# Patient Record
Sex: FEMALE | Race: BLACK OR AFRICAN AMERICAN | NOT HISPANIC OR LATINO | Employment: UNEMPLOYED | URBAN - METROPOLITAN AREA
[De-identification: names, ages, dates, MRNs, and addresses within clinical notes are randomized per-mention and may not be internally consistent; named-entity substitution may affect disease eponyms.]

---
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
## 2022-09-13 ENCOUNTER — HOSPITAL ENCOUNTER (EMERGENCY)
Facility: HOSPITAL | Age: 22
Discharge: HOME | End: 2022-09-13
Attending: EMERGENCY MEDICINE

## 2022-09-13 VITALS
OXYGEN SATURATION: 100 % | SYSTOLIC BLOOD PRESSURE: 120 MMHG | BODY MASS INDEX: 44.73 KG/M2 | RESPIRATION RATE: 15 BRPM | DIASTOLIC BLOOD PRESSURE: 84 MMHG | HEART RATE: 64 BPM | TEMPERATURE: 98 F | WEIGHT: 285 LBS | HEIGHT: 67 IN

## 2022-09-13 DIAGNOSIS — H92.01 OTALGIA OF RIGHT EAR: Primary | ICD-10-CM

## 2022-09-13 PROCEDURE — 99282 EMERGENCY DEPT VISIT SF MDM: CPT

## 2022-09-13 NOTE — Clinical Note
"Meghana "Meghana" Tammy was seen and treated in our emergency department on 9/13/2022.  She may return to work on 09/15/2022.       If you have any questions or concerns, please don't hesitate to call.      ROB RN    "

## 2022-09-13 NOTE — ED PROVIDER NOTES
Encounter Date: 9/13/2022       History     Chief Complaint   Patient presents with    Otalgia     Right ear pain x 3 days that has not improved. VSS WNL, NADN at this time. Ibuprofen taken today did improve pain.      21-year-old female presents emergency room for evaluation of right ear pain for the last 3 days.  No changes in hearing.  No drainage.  No fever chills.  No dental problems.  Patient did take an ibuprofen which significantly improved her pain but wanted to be checked out.  No additional complaints.    Review of patient's allergies indicates:  No Known Allergies  History reviewed. No pertinent past medical history.  History reviewed. No pertinent surgical history.  History reviewed. No pertinent family history.     Review of Systems   Constitutional:  Negative for chills, fatigue and fever.   HENT:  Positive for ear pain. Negative for congestion, hearing loss, sore throat and trouble swallowing.    Eyes:  Negative for visual disturbance.   Respiratory:  Negative for cough, chest tightness and shortness of breath.    Cardiovascular:  Negative for chest pain.   Gastrointestinal:  Negative for abdominal pain and nausea.   Endocrine: Negative for polyuria.   Genitourinary:  Negative for difficulty urinating.   Musculoskeletal:  Negative for arthralgias and myalgias.   Skin:  Negative for rash.   Neurological:  Negative for dizziness and headaches.   Psychiatric/Behavioral:  The patient is not nervous/anxious.      Physical Exam     Initial Vitals [09/13/22 1721]   BP Pulse Resp Temp SpO2   122/85 61 20 98.7 °F (37.1 °C) 100 %      MAP       --         Physical Exam    Nursing note and vitals reviewed.  Constitutional: She appears well-developed and well-nourished.   HENT:   Exam of the right ear shows a normal tympanic membrane with small amount of fluid.  No erythema.  TM is mobile.    Otherwise external Ear is without lesions or tenderness. No discoloration, edema, or tenderness of the mastoid. EACs  nonobstructed, without swelling or erythema. TM b/l is pearly gray and translucent with anterior/inferior light reflex and nondistorted landmarks. TM b/l is mobile. No evidence of middle ear effusion. Patient can lateralize sound with no decreased hearing b/l.     Nose is midline without lesions. Nasal passages patent. Mucosa is pink and moist without hemorrhage or lesions.   No maxillary or frontal sinus tenderness.     Lips, tongue, and oral mucosa are pink and moist without lesions. Tonsils are Grade 0 and equal with midline uvula. Teeth are in good repair and nontender. No palpable stones/masses/induration.     Eyes: Conjunctivae are normal. Pupils are equal, round, and reactive to light.   Neck: Neck supple.   Normal range of motion.  Cardiovascular:  Normal rate, regular rhythm and normal heart sounds.           Pulmonary/Chest: Breath sounds normal.   Abdominal: Abdomen is soft. She exhibits no distension. There is no abdominal tenderness.   Musculoskeletal:         General: Normal range of motion.      Cervical back: Normal range of motion and neck supple.     Neurological: She is alert and oriented to person, place, and time. GCS score is 15. GCS eye subscore is 4. GCS verbal subscore is 5. GCS motor subscore is 6.   Skin: Skin is warm and dry. Capillary refill takes less than 2 seconds.   Psychiatric: She has a normal mood and affect. Her behavior is normal. Judgment and thought content normal.       ED Course   Procedures  Labs Reviewed - No data to display       Imaging Results    None          Medications - No data to display  Medical Decision Making:   Initial Assessment:   Nontoxic, well-appearing and in no acute distress.  ED Management:  21-year-old female presents emergency room for evaluation of right ear pain for the last 3 days that has improved with ibuprofen.  Patient has no systemic signs of illness.  Physical exam has no evidence of infection or inflammation, only mild fluid behind the ears.   Remainder of ENT exam is benign.  Patient is nontoxic, afebrile and in no acute distress.  Likely serous otitis media.  Regardless, no indication for intervention/antibiotics at this time.  Recommend she continue with ibuprofen and Tylenol for pain management and return for worsening symptoms or persistence.      Disposition:  Improved, discharged  Plan to discharge home with appropriate follow-up, including primary care manager.    I discussed the findings and plan of care with this patient.  All questions were answered to the patient's satisfaction.  Disposition plan as above.  Verbal and written discharge instructions provided to the patient on discharge.  Return precautions discussed prior to discharge.     I discuss this patient case with the cosigning physician, who agrees with diagnosis and plan of care. This note was written using the assistance of a dictation program and may contain grammatical errors.                  Attending Note:   I discussed the patient's care with Advanced Practice Clinician.  I reviewed their note and agree with the history, physical, assessment, diagnosis, treatment, all procedures performed, xray and EKG interpretations and discharge plan provided by the Advanced Practice Clinician. My overall impression is otalgia of right ear.  The patient has been instructed to follow up with their physician or the one provided as well as specific return precautions.   Chrissy Slater M.D. 9/13/2022 10:25 PM         Clinical Impression:   Final diagnoses:  [H92.01] Otalgia of right ear (Primary)        ED Disposition Condition    Discharge Stable          ED Prescriptions    None       Follow-up Information       Follow up With Specialties Details Why Contact Info Additional Information    [facility] - Emergency Dept Emergency Medicine Go to  As needed, If symptoms worsen [address] [facility] [id number] [phone] 1st floor             Garrett Kraft  ROSALINDA  09/13/22 1813       Chrissy Slater MD  09/13/22 0381

## 2023-02-24 ENCOUNTER — HOSPITAL ENCOUNTER (EMERGENCY)
Facility: HOSPITAL | Age: 23
Discharge: HOME | End: 2023-02-24
Attending: EMERGENCY MEDICINE
Payer: MEDICAID

## 2023-02-24 VITALS
DIASTOLIC BLOOD PRESSURE: 67 MMHG | OXYGEN SATURATION: 100 % | RESPIRATION RATE: 16 BRPM | WEIGHT: 285 LBS | SYSTOLIC BLOOD PRESSURE: 138 MMHG | HEIGHT: 67 IN | BODY MASS INDEX: 44.73 KG/M2 | TEMPERATURE: 98 F | HEART RATE: 62 BPM

## 2023-02-24 DIAGNOSIS — G43.809 OTHER MIGRAINE WITHOUT STATUS MIGRAINOSUS, NOT INTRACTABLE: Primary | ICD-10-CM

## 2023-02-24 LAB
B-HCG UR QL: NEGATIVE
CTP QC/QA: YES

## 2023-02-24 PROCEDURE — 25000003 PHARM REV CODE 250: Performed by: NURSE PRACTITIONER

## 2023-02-24 PROCEDURE — 96374 THER/PROPH/DIAG INJ IV PUSH: CPT

## 2023-02-24 PROCEDURE — 96361 HYDRATE IV INFUSION ADD-ON: CPT

## 2023-02-24 PROCEDURE — 81025 URINE PREGNANCY TEST: CPT | Performed by: NURSE PRACTITIONER

## 2023-02-24 PROCEDURE — 96375 TX/PRO/DX INJ NEW DRUG ADDON: CPT

## 2023-02-24 PROCEDURE — 63600175 PHARM REV CODE 636 W HCPCS: Performed by: NURSE PRACTITIONER

## 2023-02-24 PROCEDURE — 99284 EMERGENCY DEPT VISIT MOD MDM: CPT | Mod: 25

## 2023-02-24 RX ORDER — KETOROLAC TROMETHAMINE 30 MG/ML
15 INJECTION, SOLUTION INTRAMUSCULAR; INTRAVENOUS
Status: COMPLETED | OUTPATIENT
Start: 2023-02-24 | End: 2023-02-24

## 2023-02-24 RX ORDER — PROCHLORPERAZINE EDISYLATE 5 MG/ML
10 INJECTION INTRAMUSCULAR; INTRAVENOUS
Status: COMPLETED | OUTPATIENT
Start: 2023-02-24 | End: 2023-02-24

## 2023-02-24 RX ORDER — BUTALBITAL, ACETAMINOPHEN AND CAFFEINE 50; 325; 40 MG/1; MG/1; MG/1
1 TABLET ORAL EVERY 4 HOURS PRN
Qty: 20 TABLET | Refills: 0 | Status: SHIPPED | OUTPATIENT
Start: 2023-02-24 | End: 2023-03-08 | Stop reason: SDUPTHER

## 2023-02-24 RX ORDER — DIPHENHYDRAMINE HYDROCHLORIDE 50 MG/ML
12.5 INJECTION INTRAMUSCULAR; INTRAVENOUS
Status: COMPLETED | OUTPATIENT
Start: 2023-02-24 | End: 2023-02-24

## 2023-02-24 RX ADMIN — KETOROLAC TROMETHAMINE 15 MG: 30 INJECTION, SOLUTION INTRAMUSCULAR; INTRAVENOUS at 04:02

## 2023-02-24 RX ADMIN — SODIUM CHLORIDE 1000 ML: 0.9 INJECTION, SOLUTION INTRAVENOUS at 04:02

## 2023-02-24 RX ADMIN — DIPHENHYDRAMINE HYDROCHLORIDE 12.5 MG: 50 INJECTION INTRAMUSCULAR; INTRAVENOUS at 04:02

## 2023-02-24 RX ADMIN — PROCHLORPERAZINE EDISYLATE 10 MG: 5 INJECTION INTRAMUSCULAR; INTRAVENOUS at 04:02

## 2023-02-24 NOTE — DISCHARGE INSTRUCTIONS
Make a follow-up appoint with Dr. Colten Dunbar.  Make a follow-up appoint for a primary care doctor at [facility].  Taking medications as prescribed.  Return to the ED for any worsening of symptoms or any other concerns.

## 2023-02-24 NOTE — ED PROVIDER NOTES
Encounter Date: 2/24/2023       History     Chief Complaint   Patient presents with    Migraine     X 3 weeks      Presents with complaint of a migraine headache.  Patient has history of same.  Onset 3 weeks.  She states she is taken Excedrin migraine BC powders and ibuprofen with no relief.  She has just recently moved here and has no neurologist.  She denies nausea vomiting photophobia or blurred vision.    Review of patient's allergies indicates:  No Known Allergies  No past medical history on file.  No past surgical history on file.  No family history on file.     Review of Systems   Constitutional:  Negative for chills and fever.   Respiratory:  Negative for cough, shortness of breath and wheezing.    Cardiovascular:  Negative for chest pain, palpitations and leg swelling.   Gastrointestinal:  Negative for abdominal pain, diarrhea, nausea and vomiting.   Musculoskeletal:  Negative for back pain.   Skin:  Negative for rash.   Neurological:  Positive for headaches. Negative for dizziness, speech difficulty, weakness, light-headedness and numbness.     Physical Exam     Initial Vitals [02/24/23 1442]   BP Pulse Resp Temp SpO2   (!) 150/71 88 20 98.1 °F (36.7 °C) 99 %      MAP       --         Physical Exam    Constitutional: She appears well-developed and well-nourished.   HENT:   Head: Normocephalic and atraumatic.   Mouth/Throat: Oropharynx is clear and moist.   Eyes: Conjunctivae are normal. Pupils are equal, round, and reactive to light.   Neck: Neck supple.   Normal range of motion.  Cardiovascular:  Normal rate, regular rhythm and normal heart sounds.           Pulmonary/Chest: Breath sounds normal. No respiratory distress.   Musculoskeletal:         General: Normal range of motion.      Cervical back: Normal range of motion and neck supple.      Comments: Patient is ambulatory per self     Neurological: She is alert and oriented to person, place, and time. No sensory deficit. GCS score is 15. GCS eye  subscore is 4. GCS verbal subscore is 5. GCS motor subscore is 6.   Skin: Skin is warm and dry. Capillary refill takes less than 2 seconds.   Psychiatric: She has a normal mood and affect. Thought content normal.       ED Course   Procedures  Labs Reviewed   POCT URINE PREGNANCY          Imaging Results    None          Medications   sodium chloride 0.9% bolus 1,000 mL 1,000 mL (0 mLs Intravenous Stopped 2/24/23 1719)   ketorolac injection 15 mg (15 mg Intravenous Given 2/24/23 1619)   prochlorperazine injection Soln 10 mg (10 mg Intravenous Given 2/24/23 1619)   diphenhydrAMINE injection 12.5 mg (12.5 mg Intravenous Given 2/24/23 1619)     Medical Decision Making:   Initial Assessment:   Presents with migraine headache.  Onset 3 weeks.  Patient reports she has history of same.  She is taking BC powder Excedrin migraine and ibuprofen without any relief.  She recently moved here so she has no PCP or neurologist.  Denies blurred vision photophobia nausea and vomiting.  ED Management:  Patient was given a L of normal saline with Toradol Compazine and Benadryl.  She reports her headache is now a 3/10.  She states she is feeling much better.  I have discharged her with a prescription for Fioricet to take p.r.n..  I have given her Dr. Colten Dunbar's name +[facility] is phone number for primary care.  At discharge is patient appeared to be in no acute distress.  She is much improved.  Have discussed this patient with                         Clinical Impression:   Final diagnoses:  [G43.809] Other migraine without status migrainosus, not intractable (Primary)        ED Disposition Condition    Discharge Stable          ED Prescriptions       Medication Sig Dispense Start Date End Date Auth. Provider    butalbital-acetaminophen-caffeine -40 mg (FIORICET, ESGIC) -40 mg per tablet Take 1 tablet by mouth every 4 (four) hours as needed for Pain. 20 tablet 2/24/2023 3/26/2023 Nancy Shipman NP           Follow-up Information       Follow up With Specialties Details Why Contact Info    [facility]  In 3 days  [address] [address]  [phone]      Eduardo Dunbar MD Vascular Neurology, Neurology In 3 days  [address]  [place] [address]  [phone]               Nancy Shipman NP  02/24/23 1747       Natalie Pedroza MD  02/24/23 2015

## 2023-02-25 ENCOUNTER — HOSPITAL ENCOUNTER (EMERGENCY)
Facility: HOSPITAL | Age: 23
Discharge: HOME | End: 2023-02-25
Attending: EMERGENCY MEDICINE
Payer: MEDICAID

## 2023-02-25 VITALS
OXYGEN SATURATION: 98 % | HEART RATE: 75 BPM | SYSTOLIC BLOOD PRESSURE: 137 MMHG | HEIGHT: 67 IN | RESPIRATION RATE: 16 BRPM | WEIGHT: 293 LBS | DIASTOLIC BLOOD PRESSURE: 82 MMHG | TEMPERATURE: 98 F | BODY MASS INDEX: 45.99 KG/M2

## 2023-02-25 DIAGNOSIS — R51.9 NONINTRACTABLE HEADACHE, UNSPECIFIED CHRONICITY PATTERN, UNSPECIFIED HEADACHE TYPE: Primary | ICD-10-CM

## 2023-02-25 PROCEDURE — 63600175 PHARM REV CODE 636 W HCPCS: Performed by: EMERGENCY MEDICINE

## 2023-02-25 PROCEDURE — 96372 THER/PROPH/DIAG INJ SC/IM: CPT | Performed by: EMERGENCY MEDICINE

## 2023-02-25 PROCEDURE — 99284 EMERGENCY DEPT VISIT MOD MDM: CPT | Mod: 25

## 2023-02-25 RX ORDER — DEXAMETHASONE SODIUM PHOSPHATE 4 MG/ML
8 INJECTION, SOLUTION INTRA-ARTICULAR; INTRALESIONAL; INTRAMUSCULAR; INTRAVENOUS; SOFT TISSUE
Status: DISCONTINUED | OUTPATIENT
Start: 2023-02-25 | End: 2023-02-25

## 2023-02-25 RX ORDER — DEXAMETHASONE SODIUM PHOSPHATE 4 MG/ML
8 INJECTION, SOLUTION INTRA-ARTICULAR; INTRALESIONAL; INTRAMUSCULAR; INTRAVENOUS; SOFT TISSUE
Status: COMPLETED | OUTPATIENT
Start: 2023-02-25 | End: 2023-02-25

## 2023-02-25 RX ADMIN — DEXAMETHASONE SODIUM PHOSPHATE 8 MG: 4 INJECTION, SOLUTION INTRA-ARTICULAR; INTRALESIONAL; INTRAMUSCULAR; INTRAVENOUS; SOFT TISSUE at 04:02

## 2023-02-25 NOTE — ED PROVIDER NOTES
Encounter Date: 2/25/2023       History     Chief Complaint   Patient presents with    Headache     For 3 weeks, seen yesterday for same     Patient presents complaining of headache.  Patient was here for the same yesterday.  Patient complains of a 4 to 8/10 headache that is mostly right-sided that has been intermittent for the last 3 weeks.  No sudden onset.  No thunderclap.  At the worst symptoms are mild-to-moderate.  Nothing makes it better or worse.  No one-sided numbness tingling or weakness.    Review of patient's allergies indicates:  No Known Allergies  Past Medical History:   Diagnosis Date    Mixed hyperlipidemia      History reviewed. No pertinent surgical history.  History reviewed. No pertinent family history.  Social History     Tobacco Use    Smoking status: Never    Smokeless tobacco: Never   Substance Use Topics    Alcohol use: Not Currently     Review of Systems   All other systems reviewed and are negative.    Physical Exam     Initial Vitals [02/25/23 1450]   BP Pulse Resp Temp SpO2   137/82 75 16 98.2 °F (36.8 °C) 98 %      MAP       --         Physical Exam    Nursing note and vitals reviewed.  Constitutional: She appears well-developed and well-nourished.   Pleasant, polite   HENT:   Head: Normocephalic and atraumatic.   Mouth/Throat: Oropharynx is clear and moist.   Eyes: EOM are normal.   Neck: Neck supple.   Normal range of motion.  Cardiovascular:  Normal rate, regular rhythm, normal heart sounds and intact distal pulses.           Pulmonary/Chest: Breath sounds normal. No respiratory distress.   Abdominal: Abdomen is soft.   Musculoskeletal:         General: Normal range of motion.      Cervical back: Normal range of motion and neck supple.     Neurological: She is alert and oriented to person, place, and time. She has normal strength.   Vision - Normal  Aphasia - Normal  Neglect - Normal  Pronator drift - Normal  Cerebellum - Normal  RUE strength - Normal  RLE strength - Normal  LUE  strength - Normal  LLE strength - Normal   Skin: Skin is warm and dry. Capillary refill takes less than 2 seconds.   Psychiatric: She has a normal mood and affect. Her behavior is normal. Judgment and thought content normal.       ED Course   Procedures  Labs Reviewed - No data to display       Imaging Results              CT Head Without Contrast (Final result)  Result time 02/25/23 15:49:43      Final result by Pablito Ly MD (02/25/23 15:49:43)                   Narrative:    CMS MANDATED QUALITY DATA-CT RADIATION DOSE-436  All CT scans at this facility dose modulation, iterative reconstruction, and or weight-based dosing when appropriate to reduce radiation dose to as low as reasonably achievable.    HISTORY: Headache, new or worsening, neuro deficit (Age 19-49y)    FINDINGS: No prior studies for comparison. There is no acute intracranial hemorrhage, with no mass effect or abnormal extra-axial fluid. Gray white differentiation is maintained, with the cortical sulci, ventricles and basal cisterns normal in size for the patient's age.    The cerebellum and brainstem are unremarkable. The visualized paranasal sinuses and mastoid air cells are clear.  There is no acute osseous abnormality.    IMPRESSION: Negative noncontrast head CT.    Electronically signed by:  Pablito Ly MD  2/25/2023 3:49 PM CST Workstation: [id number]                                     Medications   dexAMETHasone injection 8 mg (8 mg Intramuscular Given 2/25/23 1627)     Medical Decision Making:   Initial Assessment:   No apparent distress  Differential Diagnosis:   Considerations include but are not limited to migraine, tumor, mass, sinusitis  ED Management:  Repeat ER visit with recurring migraine with negative head CT in the emergency department.  I personally reviewed and I reviewed the radiologist's interpretation.  Patient received IM Decadron with improvement.  Patient will be discharged in stable condition.  Detailed return  precautions discussed.                        Clinical Impression:   Final diagnoses:  [R51.9] Nonintractable headache, unspecified chronicity pattern, unspecified headache type (Primary)        ED Disposition Condition    Discharge Stable          ED Prescriptions    None       Follow-up Information    None          Edward Cid MD  02/25/23 0632

## 2023-03-03 ENCOUNTER — PATIENT OUTREACH (OUTPATIENT)
Dept: EMERGENCY MEDICINE | Facility: HOSPITAL | Age: 23
End: 2023-03-03

## 2023-03-03 NOTE — PROGRESS NOTES
Chen Mccullough  ED Navigator  Emergency Department    Project: [facility] ED Navigator  Role: Community Health Worker    Date: 03/03/2023  Patient Name: eMghana Munoz  MRN: [id number]  PCP: Primary Doctor No    Assessment:     Meghana Munoz is a 22 y.o. female who has presented to ED for Headache. Patient has visited the ED 2 times in the past 3 months. Patient did not contact PCP.     ED Navigator Initial Assessment    ED Navigator Enrollment Documentation  Consent to Services  Does patient consent to completing the assessment?: Yes  Contact  Method of Initial Contact: Phone  Transportation  Does the patient have issues with Transportation?: No  Does the patient have transportation to and from healthcare appointments?: Yes  Insurance Coverage  Do you have coverage/adequate coverage?: Yes  Type/kind of coverage: Medicaid/[employer]na  Is patient able to afford co-pays/deductibles?: Yes  Is patient able to afford HME or supplies?: Yes  Does patient have an established [facility] PCP?: No  Does patient need assistance finding a PCP?: Yes  Does the patient have a lack of adequate coverage?: No  Specialist Appointment  Did the patient come to the ED to see a specialist?: No  Does the patient have a pending specialist referral?: No  Does the patient have a specialist appointment made?: No  PCP Follow Up Appointment  Has the patient had an appointment with a primary care provider in the past year?: No  Does the patient have a follow up appontment with a PCP?: No  When was the last time you saw your PCP?: 3/3/22  Why does the patient not have a follow up scheduled?: No established [facility]/outside PCP  Would you like an [facility] PCP?: Yes  Medications  Is patient able to afford medication?: Yes  Is patient unable to get medication due to lack of transportation?: No  Psychological  Does the patient have psycho-social concerns?: Yes  What concerns does the patient have?: Difficulty coping with life situations, Anxiety and/or  Depression  Food  Does the patient have concerns about food?: No  Communication/Education  Does the patient have limited English proficiency/English not primary language?: No  Does patient have low literacy and/or low health literacy?: Yes  Does patient have concerns with care?: No  Does patient have dissatisfaction with care?: No  Other Financial Concerns  Does the patient have immediate financial distress?: No  Does the patient have general financial concerns?: No  Other Social Barriers/Concerns  Does the patient have any additional barriers or concerns?: Dental  Primary Barrier  Barriers identified: Cognitive barrier (health literacy, language and communication, etc.)  Root Cause of ED Utilization: Lack of Access to Primary Care  Plan to address Lack of Access to Primary Care: Provided information for [facility] ([facility], [facility], etc.), Provided information for [facility] On [facility] 24/7 Nurse Triage line [phone] or 1-866-[facility] ([phone])  Next steps: Provided Education  Was education/educational materials provided surrounding PCP services/creating a medical home?: Yes Was education verbal or written?: Written     Was education/educational materials provided surrounding low cost, healthy foods?: Yes Was education verbal or written?: Written     Was education/educational materials provided surrounding other items? If so, use comment to explain.: Yes Was education verbal or written?: Written   Plan: Provided information for [facility] 24/7 Nurse triage line, [phone] or [facility] ([phone])  Expected Date of Follow Up 1: 3/31/23         Social History     Socioeconomic History    Marital status: Single   Tobacco Use    Smoking status: Never    Smokeless tobacco: Never   Substance and Sexual Activity    Alcohol use: Not Currently     Social Determinants of Health     Financial Resource Strain: Low Risk      Difficulty of Paying Living Expenses: Not hard at all   Food Insecurity: No Food Insecurity    Worried About Running Out of Food in the Last Year: Never true    Ran Out of Food in the Last Year: Never true   Transportation Needs: No Transportation Needs    Lack of Transportation (Medical): No    Lack of Transportation (Non-Medical): No   Stress: No Stress Concern Present    Feeling of Stress : Only a little   Social Connections: Unknown    Frequency of Communication with Friends and Family: More than three times a week    Frequency of Social Gatherings with Friends and Family: More than three times a week    Marital Status: Never    Housing Stability: Unknown    Unable to Pay for Housing in the Last Year: No    Unstable Housing in the Last Year: No       Plan:   Spoke with patient on the telephone and completed initial enrollment.  Patient reported she recently moved to [place] from [place] and has not had time to establish a PCP.  Patient accepted my offer to help her establish one, and also requested information on dentists who accept her insurance.  Patient stated she is still having headaches since her ER visit, and she has an eye exam scheduled next week to see if vision issues may be causing them.  Patient said she still may need to see a neurologist if vision is not the reason for her headaches.  Patient denied any concerns with food, housing, utilities, or transportation. Patient reported no use of cigarettes or alcohol.  When asked about depression/anxiety, patient stated she has moments of sadness and also lost two uncles within the last year.  Patient was given education on ([facility] information, [facility] Virtual [facility] information, and Heart healthy diet tips), [facility] Nurse Triage line, behavioral health resources, dental resources, list of neurologists from the [employer] web site.  An email was sent to Kiera Hernandez at [facility] requesting an appointment for patient to  establish a PCP.    Chen Mccullough  ED Navigator

## 2023-03-08 ENCOUNTER — HOSPITAL ENCOUNTER (EMERGENCY)
Facility: HOSPITAL | Age: 23
Discharge: HOME | End: 2023-03-08
Attending: EMERGENCY MEDICINE
Payer: MEDICAID

## 2023-03-08 VITALS
BODY MASS INDEX: 45.99 KG/M2 | TEMPERATURE: 98 F | OXYGEN SATURATION: 99 % | HEIGHT: 67 IN | SYSTOLIC BLOOD PRESSURE: 141 MMHG | DIASTOLIC BLOOD PRESSURE: 77 MMHG | RESPIRATION RATE: 18 BRPM | HEART RATE: 77 BPM | WEIGHT: 293 LBS

## 2023-03-08 DIAGNOSIS — R51.9 NONINTRACTABLE HEADACHE, UNSPECIFIED CHRONICITY PATTERN, UNSPECIFIED HEADACHE TYPE: Primary | ICD-10-CM

## 2023-03-08 PROCEDURE — 99282 EMERGENCY DEPT VISIT SF MDM: CPT

## 2023-03-08 RX ORDER — BUTALBITAL, ACETAMINOPHEN AND CAFFEINE 50; 325; 40 MG/1; MG/1; MG/1
1 TABLET ORAL EVERY 4 HOURS PRN
Qty: 12 TABLET | Refills: 0 | Status: SHIPPED | OUTPATIENT
Start: 2023-03-08 | End: 2023-03-13

## 2023-03-08 NOTE — ED PROVIDER NOTES
Encounter Date: 3/8/2023       History     Chief Complaint   Patient presents with    Headache     Headaches x1 month, seen here for same, relieved by fioricet, unable to get neurology appointment until tomorrow     22-year-old female with no significant past medical history other than hyperlipidemia presents emergency department with headache.  Patient says that when she was 12 years old she is to get migraines but has not had any for the last 10 years.  She says that over the last month she is been getting headaches.  She says that described as a burning-type sensation mostly on the right side of her head sometimes throbbing sometimes dull.  It has been coming and going for the last month.  She says that she is been to the ER twice.  She has had a CT scan which did not show any acute abnormalities in her brain.  She says that she was given a prescription for Fioricet and is out of Fioricet.  Fioricet was the only thing that seems to help with her headaches.  She is tried over-the-counter medications but she has not received any relief.  She made a follow-up with an ophthalmologist because she thought astigmatism might be causing her headaches and the eye doctor said that she could try some glasses to see if it helped.  She did get an eye exam and no abnormalities were found per the patient.  Patient states that Fioricet was the only thing that was helping her.  She is out of her Fioricet took her last dose yesterday she says she has an appointment tomorrow with neurologist.  Headaches have been persistent but have not gotten worse or changed.  They have just been persistent for the last month.  Not sudden onset, not associated with any syncope not associated with any seizure activity.  No reported fever chills.  No visual disturbance reported.    Review of patient's allergies indicates:  No Known Allergies  Past Medical History:   Diagnosis Date    Mixed hyperlipidemia      No past surgical history on file.  No  family history on file.  Social History     Tobacco Use    Smoking status: Never    Smokeless tobacco: Never   Substance Use Topics    Alcohol use: Not Currently     Review of Systems   Constitutional:  Negative for chills and fever.   HENT:  Negative for sore throat.    Respiratory:  Negative for shortness of breath.    Cardiovascular:  Negative for chest pain and palpitations.   Gastrointestinal:  Negative for abdominal pain, nausea and vomiting.   Genitourinary:  Negative for dysuria.   Musculoskeletal:  Negative for back pain.   Skin:  Negative for rash.   Neurological:  Positive for headaches. Negative for weakness.   Hematological:  Does not bruise/bleed easily.   All other systems reviewed and are negative.    Physical Exam     Initial Vitals [03/08/23 1436]   BP Pulse Resp Temp SpO2   (!) 141/77 77 18 98.2 °F (36.8 °C) 99 %      MAP       --         Physical Exam    Nursing note and vitals reviewed.  Constitutional: She appears well-developed and well-nourished. No distress.   HENT:   Head: Normocephalic and atraumatic.   Mouth/Throat: No oropharyngeal exudate.   Eyes: Conjunctivae and EOM are normal. Pupils are equal, round, and reactive to light.   Neck: Neck supple. No tracheal deviation present.   No neck stiffness, no meningismus   Normal range of motion.  Cardiovascular:  Normal rate, regular rhythm, normal heart sounds and intact distal pulses.           No murmur heard.  Pulmonary/Chest: Breath sounds normal. No stridor. No respiratory distress. She has no wheezes. She has no rhonchi. She has no rales.   Abdominal: Abdomen is soft. She exhibits no distension. There is no abdominal tenderness. There is no rebound and no guarding.   Musculoskeletal:         General: No tenderness or edema. Normal range of motion.      Cervical back: Normal range of motion and neck supple.     Lymphadenopathy:     She has no cervical adenopathy.   Neurological: She is alert and oriented to person, place, and time. She  has normal strength. No cranial nerve deficit or sensory deficit. GCS score is 15. GCS eye subscore is 4. GCS verbal subscore is 5. GCS motor subscore is 6.   Speech normal, no facial droop.  5/5 strength in upper and lower extremities bilaterally.  Normal finger-to-nose.  No pronator drift.   Skin: Skin is warm and dry. Capillary refill takes less than 2 seconds. No rash noted. No erythema. No pallor.   Psychiatric: She has a normal mood and affect. Her behavior is normal. Thought content normal.       ED Course   Procedures  Labs Reviewed - No data to display       Imaging Results    None          Medications - No data to display  Medical Decision Making:   ED Management:  Emergent evaluation of a 22-year-old female who presents emergency department with headache.  Initial differential includes but not limited to normal pressure hydrocephalus, I pathology, headache, migraine, tension headache, nonspecific headache, etcetera.  Patient seen here recently twice, 3rd ER visit for headache.  Has follow-up tomorrow with Neurology.  Seemed to have relief of symptoms with Fioricet and seems be out of the medication.  She has good follow-up tomorrow with Neurology on an outpatient basis.  She is requesting some more Fioricet to help with her symptoms.  Currently has a mild headache.  Patient has a nonfocal and normal neurological exam.  She is ambulatory without difficulty with a normal steady gait.  Given her reassuring exam findings I do not feel further workup in the emergency department indicated with recent normal head CT scan and nonfocal/normal neurological exam.     I Do not suspect meningitis (no fever, no stiff neck, no confusion, symptoms present x1 month). Do not suspect vascular dissection or venous thrombosis (no neck pain, neuro deficits, or seizure activity, 1 month of symptoms). Do not suspect Carbon Monoxide toxicity (no known source of CO exposure), Do not suspect temporal arteritis (too young, no  temporal artery tenderness, no jaw claudication). Do not suspect glaucoma (no eye pain, normal pupils, no conjunctival injection). Do not suspect SAH ( low pretest probability, month symptoms), Pseudotumor cerebri ( recent eye exam performed, no reported abnormalities, no visual changes) space occupying lesion ( CT negative, low pretest probability) . We discussed the low probability of these conditions and the patient seems to understand that no one is zero risk for any of these conditions, but given this information is comfortable with the work up performed in the Emergency department and is agreeable to follow up with PCP and with Neurology for further outpatient work up and /or laboratory / diagnostic testing. Pt. verbalized, understood and agreed with the treatment and follow up plan.    A dictation software program was used for this note.  Please expect some simple typographical  errors in this note.                           Clinical Impression:   Final diagnoses:  [R51.9] Nonintractable headache, unspecified chronicity pattern, unspecified headache type (Primary)        ED Disposition Condition    Discharge Stable          ED Prescriptions       Medication Sig Dispense Start Date End Date Auth. Provider    butalbital-acetaminophen-caffeine -40 mg (FIORICET, ESGIC) -40 mg per tablet Take 1 tablet by mouth every 4 (four) hours as needed for Headaches. 12 tablet 3/8/2023 3/13/2023 Dallin Washington DO          Follow-up Information       Follow up With Specialties Details Why Contact Info Additional Information    [facility] - Emergency Dept Emergency Medicine  If symptoms worsen [address] [id number]  [phone] 1st floor    Rayshawn Evans MD Neurology In 1 day  [address] [address]  [phone]                Dallin Washington, DO  03/08/23 1500       Dallin Washington, DO  03/08/23 6171

## 2023-03-08 NOTE — DISCHARGE INSTRUCTIONS
RETURN TO EMERGENCY DEPARTMENT WITHOUT FAIL, IF YOUR SYMPTOMS WORSEN, IF YOU GET NEW OR DIFFERENT SYMPTOMS, IF YOU ARE UNABLE TO FOLLOW UP AS DIRECTED, OR IF YOU HAVE ANY CONCERNS OR WORRIES.    Follow-up with Neurology tomorrow as discussed.

## 2023-04-13 LAB
HBV SURFACE AG SERPL QL IA: NEGATIVE
HIV 1+2 AB+HIV1 P24 AG SERPL QL IA: NORMAL
RPR: NORMAL
RUBELLA IMMUNE STATUS: NORMAL

## 2023-05-19 ENCOUNTER — HOSPITAL ENCOUNTER (EMERGENCY)
Facility: HOSPITAL | Age: 23
Discharge: HOME | End: 2023-05-19
Attending: EMERGENCY MEDICINE
Payer: MEDICAID

## 2023-05-19 VITALS
SYSTOLIC BLOOD PRESSURE: 105 MMHG | RESPIRATION RATE: 17 BRPM | WEIGHT: 293 LBS | HEIGHT: 67 IN | DIASTOLIC BLOOD PRESSURE: 52 MMHG | OXYGEN SATURATION: 100 % | HEART RATE: 65 BPM | BODY MASS INDEX: 45.99 KG/M2

## 2023-05-19 DIAGNOSIS — O20.0 THREATENED MISCARRIAGE: Primary | ICD-10-CM

## 2023-05-19 DIAGNOSIS — B96.89 BACTERIAL VAGINOSIS: ICD-10-CM

## 2023-05-19 DIAGNOSIS — N76.0 BACTERIAL VAGINOSIS: ICD-10-CM

## 2023-05-19 LAB
ABO + RH BLD: NORMAL
ALBUMIN SERPL BCP-MCNC: 3.1 G/DL (ref 3.5–5.2)
ALP SERPL-CCNC: 60 U/L (ref 55–135)
ALT SERPL W/O P-5'-P-CCNC: 17 U/L (ref 10–44)
ANION GAP SERPL CALC-SCNC: 9 MMOL/L (ref 8–16)
AST SERPL-CCNC: 13 U/L (ref 10–40)
B-HCG UR QL: POSITIVE
BACTERIA #/AREA URNS HPF: ABNORMAL /HPF
BASOPHILS # BLD AUTO: 0.04 K/UL (ref 0–0.2)
BASOPHILS NFR BLD: 0.5 % (ref 0–1.9)
BILIRUB SERPL-MCNC: 1 MG/DL (ref 0.1–1)
BILIRUB UR QL STRIP: NEGATIVE
BUN SERPL-MCNC: 8 MG/DL (ref 6–20)
CALCIUM SERPL-MCNC: 8.5 MG/DL (ref 8.7–10.5)
CHLORIDE SERPL-SCNC: 108 MMOL/L (ref 95–110)
CLARITY UR: ABNORMAL
CLUE CELLS VAG QL WET PREP: ABNORMAL
CO2 SERPL-SCNC: 19 MMOL/L (ref 23–29)
COLOR UR: YELLOW
CREAT SERPL-MCNC: 0.5 MG/DL (ref 0.5–1.4)
CTP QC/QA: YES
DIFFERENTIAL METHOD: NORMAL
EOSINOPHIL # BLD AUTO: 0.3 K/UL (ref 0–0.5)
EOSINOPHIL NFR BLD: 3.6 % (ref 0–8)
ERYTHROCYTE [DISTWIDTH] IN BLOOD BY AUTOMATED COUNT: 13.2 % (ref 11.5–14.5)
EST. GFR  (NO RACE VARIABLE): >60 ML/MIN/1.73 M^2
FILAMENT FUNGI VAG WET PREP-#/AREA: ABNORMAL
GLUCOSE SERPL-MCNC: 94 MG/DL (ref 70–110)
GLUCOSE UR QL STRIP: NEGATIVE
HCG INTACT+B SERPL-ACNC: NORMAL MIU/ML
HCT VFR BLD AUTO: 37.7 % (ref 37–48.5)
HGB BLD-MCNC: 13 G/DL (ref 12–16)
HGB UR QL STRIP: ABNORMAL
HYALINE CASTS #/AREA URNS LPF: 6 /LPF
IMM GRANULOCYTES # BLD AUTO: 0.04 K/UL (ref 0–0.04)
IMM GRANULOCYTES NFR BLD AUTO: 0.5 % (ref 0–0.5)
KETONES UR QL STRIP: ABNORMAL
LEUKOCYTE ESTERASE UR QL STRIP: NEGATIVE
LYMPHOCYTES # BLD AUTO: 1.7 K/UL (ref 1–4.8)
LYMPHOCYTES NFR BLD: 22 % (ref 18–48)
MCH RBC QN AUTO: 29.8 PG (ref 27–31)
MCHC RBC AUTO-ENTMCNC: 34.5 G/DL (ref 32–36)
MCV RBC AUTO: 87 FL (ref 82–98)
MICROSCOPIC COMMENT: ABNORMAL
MONOCYTES # BLD AUTO: 0.5 K/UL (ref 0.3–1)
MONOCYTES NFR BLD: 6.3 % (ref 4–15)
NEUTROPHILS # BLD AUTO: 5.1 K/UL (ref 1.8–7.7)
NEUTROPHILS NFR BLD: 67.1 % (ref 38–73)
NITRITE UR QL STRIP: NEGATIVE
NRBC BLD-RTO: 0 /100 WBC
PH UR STRIP: 7 [PH] (ref 5–8)
PLATELET # BLD AUTO: 226 K/UL (ref 150–450)
PMV BLD AUTO: 9.5 FL (ref 9.2–12.9)
POTASSIUM SERPL-SCNC: 3.5 MMOL/L (ref 3.5–5.1)
PROT SERPL-MCNC: 6.4 G/DL (ref 6–8.4)
PROT UR QL STRIP: ABNORMAL
RBC # BLD AUTO: 4.36 M/UL (ref 4–5.4)
RBC #/AREA URNS HPF: 13 /HPF (ref 0–4)
SODIUM SERPL-SCNC: 136 MMOL/L (ref 136–145)
SP GR UR STRIP: 1.02 (ref 1–1.03)
SPECIMEN SOURCE: ABNORMAL
SQUAMOUS #/AREA URNS HPF: 2 /HPF
T VAGINALIS GENITAL QL WET PREP: ABNORMAL
URN SPEC COLLECT METH UR: ABNORMAL
UROBILINOGEN UR STRIP-ACNC: NEGATIVE EU/DL
WBC # BLD AUTO: 7.6 K/UL (ref 3.9–12.7)
WBC #/AREA URNS HPF: 2 /HPF (ref 0–5)
YEAST GENITAL QL WET PREP: ABNORMAL

## 2023-05-19 PROCEDURE — 80053 COMPREHEN METABOLIC PANEL: CPT | Performed by: EMERGENCY MEDICINE

## 2023-05-19 PROCEDURE — 87205 SMEAR GRAM STAIN: CPT | Performed by: EMERGENCY MEDICINE

## 2023-05-19 PROCEDURE — 99284 EMERGENCY DEPT VISIT MOD MDM: CPT | Mod: 25

## 2023-05-19 PROCEDURE — 96360 HYDRATION IV INFUSION INIT: CPT

## 2023-05-19 PROCEDURE — 25000003 PHARM REV CODE 250: Performed by: EMERGENCY MEDICINE

## 2023-05-19 PROCEDURE — 86900 BLOOD TYPING SEROLOGIC ABO: CPT | Performed by: EMERGENCY MEDICINE

## 2023-05-19 PROCEDURE — 81001 URINALYSIS AUTO W/SCOPE: CPT | Performed by: EMERGENCY MEDICINE

## 2023-05-19 PROCEDURE — 87210 SMEAR WET MOUNT SALINE/INK: CPT | Performed by: EMERGENCY MEDICINE

## 2023-05-19 PROCEDURE — 87081 CULTURE SCREEN ONLY: CPT | Performed by: EMERGENCY MEDICINE

## 2023-05-19 PROCEDURE — 84702 CHORIONIC GONADOTROPIN TEST: CPT | Performed by: EMERGENCY MEDICINE

## 2023-05-19 PROCEDURE — 81025 URINE PREGNANCY TEST: CPT | Performed by: EMERGENCY MEDICINE

## 2023-05-19 PROCEDURE — 85025 COMPLETE CBC W/AUTO DIFF WBC: CPT | Performed by: EMERGENCY MEDICINE

## 2023-05-19 PROCEDURE — 87591 N.GONORRHOEAE DNA AMP PROB: CPT | Performed by: EMERGENCY MEDICINE

## 2023-05-19 RX ORDER — METRONIDAZOLE 500 MG/1
500 TABLET ORAL EVERY 12 HOURS
Qty: 14 TABLET | Refills: 0 | Status: SHIPPED | OUTPATIENT
Start: 2023-05-19 | End: 2023-05-26

## 2023-05-19 RX ADMIN — SODIUM CHLORIDE 1000 ML: 0.9 INJECTION, SOLUTION INTRAVENOUS at 03:05

## 2023-05-19 NOTE — ED PROVIDER NOTES
Encounter Date: 2023       History     Chief Complaint   Patient presents with    Vaginal Bleeding     Approx 10 wk pregnancy     Patient presents emergency department reported vaginal bleeding started this morning no significant cramping or pain reported she is 10 weeks pregnant prenatal care with Dr. Link she is  no difficulties with pregnancy prior to this other than Dr. Fariba hoskins does have her on progesterone states her morning sickness recently improved she denies any fever chills no other symptoms at this time states the bleeding was similar to bleeding at the beginning of her periods      Review of patient's allergies indicates:  No Known Allergies  Past Medical History:   Diagnosis Date    Mixed hyperlipidemia      No past surgical history on file.  No family history on file.  Social History     Tobacco Use    Smoking status: Never    Smokeless tobacco: Never   Substance Use Topics    Alcohol use: Not Currently     Review of Systems   Constitutional:  Negative for chills and fever.   HENT:  Negative for congestion.    Gastrointestinal:  Negative for abdominal pain, diarrhea, nausea and vomiting.   Genitourinary:  Positive for vaginal bleeding. Negative for dysuria, flank pain, pelvic pain, vaginal discharge and vaginal pain.     Physical Exam     Initial Vitals   BP Pulse Resp Temp SpO2   23 0304 23 0304 23 0304 23 0303 23 0304   (!) 143/67 96 17 (P) 98.6 °F (37 °C) 98 %      MAP       --                Physical Exam    Constitutional: She appears well-developed and well-nourished. No distress.   HENT:   Head: Normocephalic and atraumatic.   Right Ear: External ear normal.   Left Ear: External ear normal.   Mouth/Throat: Oropharynx is clear and moist.   Eyes: Conjunctivae and EOM are normal. Pupils are equal, round, and reactive to light.   Neck: Neck supple.   Normal range of motion.  Cardiovascular:  Normal rate, regular rhythm, normal heart sounds and intact  distal pulses.           Pulmonary/Chest: Breath sounds normal. No respiratory distress.   Abdominal: Abdomen is soft. Bowel sounds are normal. There is no abdominal tenderness.   Genitourinary:    Genitourinary Comments: Cristy RECINOS present for entire pelvic exam normal external female genitalia scant brown blood noted in vaginal vault cervical os closed there is no adnexal mass or tenderness appreciated gravid uterus     Musculoskeletal:         General: No edema. Normal range of motion.      Cervical back: Normal range of motion and neck supple.     Neurological: She is alert and oriented to person, place, and time. GCS score is 15. GCS eye subscore is 4. GCS verbal subscore is 5. GCS motor subscore is 6.   Skin: Skin is warm and dry. Capillary refill takes less than 2 seconds. No rash noted.   Psychiatric: She has a normal mood and affect. Her behavior is normal.       ED Course   Procedures  Labs Reviewed   VAGINAL SCREEN - Abnormal; Notable for the following components:       Result Value    Clue Cells Moderate (*)     All other components within normal limits   COMPREHENSIVE METABOLIC PANEL - Abnormal; Notable for the following components:    CO2 19 (*)     Calcium 8.5 (*)     Albumin 3.1 (*)     All other components within normal limits    Narrative:     Release to patient->Immediate   URINALYSIS, REFLEX TO URINE CULTURE - Abnormal; Notable for the following components:    Appearance, UA Hazy (*)     Protein, UA Trace (*)     Ketones, UA 1+ (*)     Occult Blood UA 3+ (*)     All other components within normal limits    Narrative:     Specimen Source->Urine   URINALYSIS MICROSCOPIC - Abnormal; Notable for the following components:    RBC, UA 13 (*)     Hyaline Casts, UA 6 (*)     All other components within normal limits    Narrative:     Specimen Source->Urine   POCT URINE PREGNANCY - Abnormal; Notable for the following components:    POC Preg Test, Ur Positive (*)     All other components within normal  limits   CULTURE, GONOCOCCUS   C. TRACHOMATIS/N. GONORRHOEAE BY AMP DNA   CBC W/ AUTO DIFFERENTIAL    Narrative:     Release to patient->Immediate   HCG, QUANTITATIVE    Narrative:     Release to patient->Immediate   GROUP & RH          Imaging Results              US OB <14 Wks TransAbd & TransVag, Single Gestation (XPD) (In process)  Result time 05/19/23 04:19:12                     Medications   sodium chloride 0.9% bolus 1,000 mL 1,000 mL (1,000 mLs Intravenous New Bag 5/19/23 5011)     Medical Decision Making:   Clinical Tests:   Lab Tests: Ordered and Reviewed  Radiological Study: Ordered and Reviewed  ED Management:  Laboratory evaluation reviewed pelvic examination did show evidence of clue cells will treat her bacterial vaginosis ultrasound shows single live intrauterine pregnancy remainder of laboratory evaluation is within normal limits advised patient's findings will treat bacterial vaginosis Flagyl recommend she follow-up with Dr. Link in 2 days rest pelvic rest drink plenty fluids return for any worsened symptoms new symptoms or concerns                        Clinical Impression:   Final diagnoses:  [O20.0] Threatened miscarriage (Primary)  [N76.0, B96.89] Bacterial vaginosis        ED Disposition Condition    Discharge Stable          ED Prescriptions       Medication Sig Dispense Start Date End Date Auth. Provider    metroNIDAZOLE (FLAGYL) 500 MG tablet Take 1 tablet (500 mg total) by mouth every 12 (twelve) hours. for 7 days 14 tablet 5/19/2023 5/26/2023 Kyle Santos MD          Follow-up Information    None          Kyle Santos MD  05/19/23 2839

## 2023-05-20 LAB
CHLAMYDIA, AMPLIFIED DNA: NEGATIVE
N GONORRHOEAE, AMPLIFIED DNA: NEGATIVE

## 2023-05-22 LAB
BACTERIA GENITAL AEROBE CULT: NORMAL
GRAM STN SPEC: NORMAL

## 2023-06-15 ENCOUNTER — OFFICE VISIT (OUTPATIENT)
Dept: FAMILY MEDICINE | Facility: CLINIC | Age: 23
End: 2023-06-15
Payer: MEDICAID

## 2023-06-15 VITALS
TEMPERATURE: 99 F | OXYGEN SATURATION: 99 % | HEART RATE: 96 BPM | SYSTOLIC BLOOD PRESSURE: 100 MMHG | WEIGHT: 293 LBS | BODY MASS INDEX: 45.99 KG/M2 | DIASTOLIC BLOOD PRESSURE: 64 MMHG | RESPIRATION RATE: 19 BRPM | HEIGHT: 67 IN

## 2023-06-15 DIAGNOSIS — R77.9 HIGH SERUM PROTEIN LEVEL: Primary | ICD-10-CM

## 2023-06-15 PROCEDURE — 3008F BODY MASS INDEX DOCD: CPT | Mod: CPTII,,, | Performed by: FAMILY MEDICINE

## 2023-06-15 PROCEDURE — 3078F DIAST BP <80 MM HG: CPT | Mod: CPTII,,, | Performed by: FAMILY MEDICINE

## 2023-06-15 PROCEDURE — 3008F PR BODY MASS INDEX (BMI) DOCUMENTED: ICD-10-PCS | Mod: CPTII,,, | Performed by: FAMILY MEDICINE

## 2023-06-15 PROCEDURE — 3074F SYST BP LT 130 MM HG: CPT | Mod: CPTII,,, | Performed by: FAMILY MEDICINE

## 2023-06-15 PROCEDURE — 3074F PR MOST RECENT SYSTOLIC BLOOD PRESSURE < 130 MM HG: ICD-10-PCS | Mod: CPTII,,, | Performed by: FAMILY MEDICINE

## 2023-06-15 PROCEDURE — 1159F MED LIST DOCD IN RCRD: CPT | Mod: CPTII,,, | Performed by: FAMILY MEDICINE

## 2023-06-15 PROCEDURE — 1159F PR MEDICATION LIST DOCUMENTED IN MEDICAL RECORD: ICD-10-PCS | Mod: CPTII,,, | Performed by: FAMILY MEDICINE

## 2023-06-15 PROCEDURE — 99213 OFFICE O/P EST LOW 20 MIN: CPT | Performed by: FAMILY MEDICINE

## 2023-06-15 PROCEDURE — 3078F PR MOST RECENT DIASTOLIC BLOOD PRESSURE < 80 MM HG: ICD-10-PCS | Mod: CPTII,,, | Performed by: FAMILY MEDICINE

## 2023-06-15 PROCEDURE — 99213 PR OFFICE/OUTPT VISIT, EST, LEVL III, 20-29 MIN: ICD-10-PCS | Mod: S$PBB,,, | Performed by: FAMILY MEDICINE

## 2023-06-15 PROCEDURE — 99213 OFFICE O/P EST LOW 20 MIN: CPT | Mod: S$PBB,,, | Performed by: FAMILY MEDICINE

## 2023-06-15 NOTE — PROGRESS NOTES
Subjective:       Patient ID: Meghana Munoz is a 22 y.o. female.    Chief Complaint: lab results      Patient is here for follow-up she had blood work done at the dermatologist that showed high protein in the blood      Allergies and Medications:   Review of patient's allergies indicates:  No Known Allergies  No current outpatient medications on file.     No current facility-administered medications for this visit.       Family History:   No family history on file.    Social History:   Social History     Socioeconomic History    Marital status: Single   Tobacco Use    Smoking status: Never    Smokeless tobacco: Never   Substance and Sexual Activity    Alcohol use: Not Currently     Social Determinants of Health     Financial Resource Strain: Low Risk     Difficulty of Paying Living Expenses: Not hard at all   Food Insecurity: No Food Insecurity    Worried About Running Out of Food in the Last Year: Never true    Ran Out of Food in the Last Year: Never true   Transportation Needs: No Transportation Needs    Lack of Transportation (Medical): No    Lack of Transportation (Non-Medical): No   Stress: No Stress Concern Present    Feeling of Stress : Only a little   Social Connections: Unknown    Frequency of Communication with Friends and Family: More than three times a week    Frequency of Social Gatherings with Friends and Family: More than three times a week    Marital Status: Never    Housing Stability: Unknown    Unable to Pay for Housing in the Last Year: No    Unstable Housing in the Last Year: No       Review of Systems    Objective:     Vitals:    06/15/23 1546   BP: 100/64   Pulse: 96   Resp: 19   Temp: 98.6 °F (37 °C)        Physical Exam  Vitals and nursing note reviewed.   Constitutional:       General: She is not in acute distress.     Appearance: Normal appearance. She is well-developed and normal weight. She is not ill-appearing, toxic-appearing or diaphoretic.   HENT:      Head: Normocephalic and  atraumatic.   Eyes:      Pupils: Pupils are equal, round, and reactive to light.   Cardiovascular:      Rate and Rhythm: Normal rate and regular rhythm.      Heart sounds: Normal heart sounds. No murmur heard.    No friction rub. No gallop.   Pulmonary:      Effort: Pulmonary effort is normal. No respiratory distress.      Breath sounds: Normal breath sounds. No stridor. No wheezing, rhonchi or rales.   Chest:      Chest wall: No tenderness.   Musculoskeletal:      Right lower leg: No edema.      Left lower leg: No edema.   Skin:     Comments: Erythema nodosum on the shins   Neurological:      Mental Status: She is alert.   Psychiatric:         Behavior: Behavior normal.         Thought Content: Thought content normal.         Judgment: Judgment normal.       Assessment:       1. High serum protein level        Plan:       Meghana was seen today for lab results.    Diagnoses and all orders for this visit:    High serum protein level  -     CBC Auto Differential; Future  -     Comprehensive Metabolic Panel; Future  -     Protein Electrophoresis, Serum; Future         Follow up in about 1 year (around 6/15/2024).

## 2023-06-17 ENCOUNTER — LAB VISIT (OUTPATIENT)
Dept: LAB | Facility: HOSPITAL | Age: 23
End: 2023-06-17
Attending: FAMILY MEDICINE
Payer: MEDICAID

## 2023-06-17 DIAGNOSIS — R77.9 HIGH SERUM PROTEIN LEVEL: ICD-10-CM

## 2023-06-17 LAB
ALBUMIN SERPL BCP-MCNC: 3.1 G/DL (ref 3.5–5.2)
ALP SERPL-CCNC: 55 U/L (ref 55–135)
ALT SERPL W/O P-5'-P-CCNC: 28 U/L (ref 10–44)
ANION GAP SERPL CALC-SCNC: 7 MMOL/L (ref 8–16)
AST SERPL-CCNC: 14 U/L (ref 10–40)
BASOPHILS # BLD AUTO: 0.01 K/UL (ref 0–0.2)
BASOPHILS NFR BLD: 0.2 % (ref 0–1.9)
BILIRUB SERPL-MCNC: 0.9 MG/DL (ref 0.1–1)
BUN SERPL-MCNC: 6 MG/DL (ref 6–20)
CALCIUM SERPL-MCNC: 8.5 MG/DL (ref 8.7–10.5)
CHLORIDE SERPL-SCNC: 105 MMOL/L (ref 95–110)
CO2 SERPL-SCNC: 22 MMOL/L (ref 23–29)
CREAT SERPL-MCNC: 0.5 MG/DL (ref 0.5–1.4)
DIFFERENTIAL METHOD: NORMAL
EOSINOPHIL # BLD AUTO: 0.2 K/UL (ref 0–0.5)
EOSINOPHIL NFR BLD: 3.3 % (ref 0–8)
ERYTHROCYTE [DISTWIDTH] IN BLOOD BY AUTOMATED COUNT: 13.2 % (ref 11.5–14.5)
EST. GFR  (NO RACE VARIABLE): >60 ML/MIN/1.73 M^2
GLUCOSE SERPL-MCNC: 95 MG/DL (ref 70–110)
HCT VFR BLD AUTO: 38 % (ref 37–48.5)
HGB BLD-MCNC: 12.9 G/DL (ref 12–16)
IMM GRANULOCYTES # BLD AUTO: 0.02 K/UL (ref 0–0.04)
IMM GRANULOCYTES NFR BLD AUTO: 0.3 % (ref 0–0.5)
LYMPHOCYTES # BLD AUTO: 1.2 K/UL (ref 1–4.8)
LYMPHOCYTES NFR BLD: 18.3 % (ref 18–48)
MCH RBC QN AUTO: 29.5 PG (ref 27–31)
MCHC RBC AUTO-ENTMCNC: 33.9 G/DL (ref 32–36)
MCV RBC AUTO: 87 FL (ref 82–98)
MONOCYTES # BLD AUTO: 0.4 K/UL (ref 0.3–1)
MONOCYTES NFR BLD: 5.6 % (ref 4–15)
NEUTROPHILS # BLD AUTO: 4.8 K/UL (ref 1.8–7.7)
NEUTROPHILS NFR BLD: 72.3 % (ref 38–73)
NRBC BLD-RTO: 0 /100 WBC
PLATELET # BLD AUTO: 227 K/UL (ref 150–450)
PMV BLD AUTO: 9.7 FL (ref 9.2–12.9)
POTASSIUM SERPL-SCNC: 3.6 MMOL/L (ref 3.5–5.1)
PROT SERPL-MCNC: 6.4 G/DL (ref 6–8.4)
RBC # BLD AUTO: 4.38 M/UL (ref 4–5.4)
SODIUM SERPL-SCNC: 134 MMOL/L (ref 136–145)
WBC # BLD AUTO: 6.65 K/UL (ref 3.9–12.7)

## 2023-06-17 PROCEDURE — 36415 COLL VENOUS BLD VENIPUNCTURE: CPT | Performed by: FAMILY MEDICINE

## 2023-06-17 PROCEDURE — 84165 PROTEIN E-PHORESIS SERUM: CPT | Performed by: FAMILY MEDICINE

## 2023-06-17 PROCEDURE — 84155 ASSAY OF PROTEIN SERUM: CPT | Performed by: FAMILY MEDICINE

## 2023-06-17 PROCEDURE — 85025 COMPLETE CBC W/AUTO DIFF WBC: CPT | Performed by: FAMILY MEDICINE

## 2023-06-17 PROCEDURE — 80053 COMPREHEN METABOLIC PANEL: CPT | Performed by: FAMILY MEDICINE

## 2023-06-19 ENCOUNTER — TELEPHONE (OUTPATIENT)
Dept: FAMILY MEDICINE | Facility: CLINIC | Age: 23
End: 2023-06-19

## 2023-06-21 ENCOUNTER — TELEPHONE (OUTPATIENT)
Dept: FAMILY MEDICINE | Facility: CLINIC | Age: 23
End: 2023-06-21

## 2023-06-21 LAB
ALBUMIN SERPL ELPH-MCNC: 2.8 G/DL (ref 2.9–4.4)
ALBUMIN/GLOB SERPL: 1 {RATIO} (ref 0.7–1.7)
ALPHA1 GLOB SERPL ELPH-MCNC: 0.3 G/DL (ref 0–0.4)
ALPHA2 GLOB SERPL ELPH-MCNC: 0.8 G/DL (ref 0.4–1)
B-GLOBULIN SERPL ELPH-MCNC: 1 G/DL (ref 0.7–1.3)
GAMMA GLOB SERPL ELPH-MCNC: 0.7 G/DL (ref 0.4–1.8)
GLOBULIN SER CALC-MCNC: 2.8 G/DL (ref 2.2–3.9)
LABORATORY COMMENT REPORT: ABNORMAL
M PROTEIN SERPL ELPH-MCNC: ABNORMAL G/DL
PROT SERPL-MCNC: 5.6 G/DL (ref 6–8.5)

## 2023-06-21 NOTE — TELEPHONE ENCOUNTER
----- Message from Leonard Gan MD sent at 6/21/2023 12:25 PM CDT -----  Results Ok, notify patient.

## 2023-09-10 ENCOUNTER — OFFICE VISIT (OUTPATIENT)
Dept: URGENT CARE | Facility: CLINIC | Age: 23
End: 2023-09-10
Payer: MEDICAID

## 2023-09-10 VITALS
SYSTOLIC BLOOD PRESSURE: 120 MMHG | TEMPERATURE: 99 F | BODY MASS INDEX: 47.09 KG/M2 | OXYGEN SATURATION: 97 % | HEIGHT: 66 IN | RESPIRATION RATE: 20 BRPM | DIASTOLIC BLOOD PRESSURE: 78 MMHG | HEART RATE: 86 BPM | WEIGHT: 293 LBS

## 2023-09-10 DIAGNOSIS — H01.001 BLEPHARITIS OF RIGHT UPPER EYELID, UNSPECIFIED TYPE: Primary | ICD-10-CM

## 2023-09-10 DIAGNOSIS — H02.843 EYELID GLAND SWELLING, RIGHT: ICD-10-CM

## 2023-09-10 PROCEDURE — 99204 PR OFFICE/OUTPT VISIT, NEW, LEVL IV, 45-59 MIN: ICD-10-PCS | Mod: S$GLB,,,

## 2023-09-10 PROCEDURE — 99204 OFFICE O/P NEW MOD 45 MIN: CPT | Mod: S$GLB,,,

## 2023-09-10 RX ORDER — ERYTHROMYCIN 5 MG/G
OINTMENT OPHTHALMIC EVERY 6 HOURS
Qty: 3.5 G | Refills: 0 | Status: SHIPPED | OUTPATIENT
Start: 2023-09-10 | End: 2023-09-17

## 2023-09-10 NOTE — PATIENT INSTRUCTIONS
Erythromycin ointment in the right eye on the affected area as prescribed    Continue warm compresses    As discussed you can use a 1:1 mixture of baby shampoo and water on a cotton swab and gently clean the upper eyelid 3 times a day.

## 2023-09-10 NOTE — PROGRESS NOTES
"Subjective:      Patient ID: Meghana Munoz is a 22 y.o. female.    Vitals:  height is 5' 6" (1.676 m) and weight is 152.4 kg (336 lb) (abnormal). Her temperature is 98.7 °F (37.1 °C). Her blood pressure is 120/78 and her pulse is 86. Her respiration is 20 and oxygen saturation is 97%.     Chief Complaint: Eye Problem    Pt states" c/o R eye swelling that has been going on for 3 days. Took warm compresses.     Eye Problem       ROS   Objective:     Physical Exam    Assessment:     No diagnosis found.    Plan:       There are no diagnoses linked to this encounter.                "

## 2023-09-10 NOTE — PROGRESS NOTES
"Subjective:      Patient ID: Meghana Munoz is a 22 y.o. female.    Vitals:  height is 5' 6" (1.676 m) and weight is 152.4 kg (336 lb) (abnormal). Her temperature is 98.7 °F (37.1 °C). Her blood pressure is 120/78 and her pulse is 86. Her respiration is 20 and oxygen saturation is 97%.     Chief Complaint: Eye Problem    Pt states" c/o R eye swelling that has been going on for 3 days. Took warm compresses.  Patient states there is a small area under the right upper eyelid that looks like a pimple.     Eye Problem   Pertinent negatives include no blurred vision, double vision or fever.       Constitution: Negative for chills, fatigue and fever.   HENT: Negative.     Neck: neck negative.   Cardiovascular: Negative.    Eyes:  Positive for eye pain and eyelid swelling (Upper right eyelid). Negative for vision loss, double vision and blurred vision.   Respiratory: Negative.     Endocrine: negative.   Genitourinary: Negative.    Musculoskeletal: Negative.    Skin: Negative.    Allergic/Immunologic: Negative.    Neurological: Negative.    Hematologic/Lymphatic: Negative.    Psychiatric/Behavioral: Negative.        Objective:     Physical Exam   Constitutional: She is oriented to person, place, and time. She appears well-developed. She does not appear ill. No distress. normal  HENT:   Head: Normocephalic and atraumatic.   Ears:   Right Ear: External ear normal.   Left Ear: External ear normal.   Nose: Nose normal.   Mouth/Throat: Oropharynx is clear and moist.   Eyes: Conjunctivae, EOM and lids are normal. Pupils are equal, round, and reactive to light. Right eye exhibits no discharge.     vision grossly intact   Neck: Trachea normal and phonation normal. Neck supple.   Cardiovascular: Normal rate.   Pulmonary/Chest: Effort normal. No respiratory distress.   Abdominal: Normal appearance.   Musculoskeletal: Normal range of motion.         General: Normal range of motion.   Neurological: She is alert and oriented to person, " place, and time.   Skin: Skin is warm, dry and intact. Capillary refill takes less than 2 seconds.   Psychiatric: Her speech is normal and behavior is normal. Judgment and thought content normal.   Nursing note and vitals reviewed.      Assessment:     1. Blepharitis of right upper eyelid, unspecified type    2. Eyelid gland swelling, right        Plan:       Blepharitis of right upper eyelid, unspecified type  -     erythromycin (ROMYCIN) ophthalmic ointment; Place into the right eye every 6 (six) hours. for 7 days  Dispense: 3.5 g; Refill: 0    Eyelid gland swelling, right      Discussed medication management as well as eyelid cleansing with patient who acknowledges understanding

## 2023-09-24 ENCOUNTER — OFFICE VISIT (OUTPATIENT)
Dept: URGENT CARE | Facility: CLINIC | Age: 23
End: 2023-09-24
Payer: MEDICAID

## 2023-09-24 VITALS
OXYGEN SATURATION: 97 % | DIASTOLIC BLOOD PRESSURE: 71 MMHG | TEMPERATURE: 98 F | BODY MASS INDEX: 47.09 KG/M2 | HEART RATE: 95 BPM | RESPIRATION RATE: 18 BRPM | HEIGHT: 66 IN | SYSTOLIC BLOOD PRESSURE: 151 MMHG | WEIGHT: 293 LBS

## 2023-09-24 DIAGNOSIS — N89.8 VAGINAL ODOR: Primary | ICD-10-CM

## 2023-09-24 DIAGNOSIS — Z3A.29 29 WEEKS GESTATION OF PREGNANCY: ICD-10-CM

## 2023-09-24 PROCEDURE — 99499 NO LOS: ICD-10-PCS | Mod: S$GLB,,, | Performed by: NURSE PRACTITIONER

## 2023-09-24 PROCEDURE — 99499 UNLISTED E&M SERVICE: CPT | Mod: S$GLB,,, | Performed by: NURSE PRACTITIONER

## 2023-09-24 NOTE — PROGRESS NOTES
"Subjective:      Patient ID: Meghana Munoz is a 22 y.o. female.    Vitals:  height is 5' 6" (1.676 m) and weight is 150.3 kg (331 lb 6.4 oz) (abnormal). Her oral temperature is 97.9 °F (36.6 °C). Her blood pressure is 151/71 (abnormal) and her pulse is 95. Her respiration is 18 and oxygen saturation is 97%.     Chief Complaint: vaginal odor    Pt states that her symptoms started on Friday morning. Patient states that her symptoms are the following: states that for about a week now when she wipes there is a yellowish discharge. states that she did have intercourse Thursday night and happened to not wash off afterwards, then Friday she noticed the onion like odor vaginally. Denies any other symptoms. Pt states that she is pregnant as well.     Spoke with patient regarding her symptoms and informed her since she is 29 weeks pregnant that she needs to follow-up with her obstetrician regarding symptoms that will require possible need for alternative medication administration to be vaginal versus p.o. with need for prior authorization through obese office which would not be obtained through an urgent care setting.  ROS   Objective:     Physical Exam    Assessment:     No diagnosis found.    Plan:       There are no diagnoses linked to this encounter.                "

## 2023-10-15 ENCOUNTER — HOSPITAL ENCOUNTER (OUTPATIENT)
Facility: HOSPITAL | Age: 23
Discharge: LEFT BEFORE DISCHARGE | End: 2023-10-15
Attending: OBSTETRICS & GYNECOLOGY | Admitting: OBSTETRICS & GYNECOLOGY
Payer: MEDICAID

## 2023-10-15 VITALS
OXYGEN SATURATION: 98 % | RESPIRATION RATE: 18 BRPM | BODY MASS INDEX: 47.09 KG/M2 | TEMPERATURE: 99 F | HEART RATE: 86 BPM | HEIGHT: 66 IN | SYSTOLIC BLOOD PRESSURE: 128 MMHG | WEIGHT: 293 LBS | DIASTOLIC BLOOD PRESSURE: 58 MMHG

## 2023-10-15 DIAGNOSIS — Z36.89 ENCOUNTER FOR TRIAGE IN PREGNANT PATIENT: ICD-10-CM

## 2023-10-15 PROCEDURE — 99285 EMERGENCY DEPT VISIT HI MDM: CPT | Mod: 25

## 2023-10-15 PROCEDURE — 99211 OFF/OP EST MAY X REQ PHY/QHP: CPT | Mod: 25

## 2023-10-16 NOTE — ED PROVIDER NOTES
Encounter Date: 10/15/2023       History     Chief Complaint   Patient presents with    Fall     Pt states was walking dogs and they pulled her to the ground. Fell on belly in grass. States she had left sided abdominal pain for 5 min following fall which has resolved. Denies LOF or vaginal bleeding.     Abdominal Pain     NO VAG BLEED     22-year-old female  32 weeks gestation presents status post a fall that happened this evening while walking her dogs.  She was walking to large 80 lb dogs when they pulled her down and she fell to the ground falling with her person front of her stomach.  Patient states she did hit her stomach with all of her weight.  She states fetal movements are decreased since accident.  She denies abdominal cramping.  Denies vaginal bleeding.  Denies back pain.  There are no other visible injuries or trauma noted during physical exam.  Patient does not report any significant medical history.  She is up-to-date on obstetrical visits in his maintained a healthy pregnancy.  Denies chest pain.  Denies shortness of breath.  Hemodynamically stable and afebrile on admit.    The history is provided by the patient.     Review of patient's allergies indicates:  No Known Allergies  Past Medical History:   Diagnosis Date    Mixed hyperlipidemia      History reviewed. No pertinent surgical history.  History reviewed. No pertinent family history.  Social History     Tobacco Use    Smoking status: Never    Smokeless tobacco: Never   Substance Use Topics    Alcohol use: Not Currently    Drug use: Never     Review of Systems   All other systems reviewed and are negative.      Physical Exam     Initial Vitals [10/15/23 1829]   BP Pulse Resp Temp SpO2   133/81 90 16 98.5 °F (36.9 °C) 98 %      MAP       --         Physical Exam    Constitutional: She appears well-developed and well-nourished. No distress.   HENT:   Head: Normocephalic.   Eyes: Pupils are equal, round, and reactive to light.   Neck:   Normal  range of motion.  Cardiovascular:  Normal rate, regular rhythm, normal heart sounds and intact distal pulses.           Pulmonary/Chest: Breath sounds normal.   Abdominal: Abdomen is soft. There is no abdominal tenderness. There is no guarding.   Genitourinary:    Uterus normal.      Genitourinary Comments: Uterus palpated above the umbilicus.     Musculoskeletal:         General: No tenderness or edema. Normal range of motion.      Cervical back: Normal range of motion.     Neurological: She is alert and oriented to person, place, and time. She has normal strength and normal reflexes. No sensory deficit.   Skin: Skin is warm and dry. Capillary refill takes less than 2 seconds.   Psychiatric: She has a normal mood and affect. Her behavior is normal. Judgment and thought content normal.         ED Course   Procedures  Labs Reviewed          Imaging Results    None          Medications - No data to display  Medical Decision Making  22-year-old well-appearing well-nourished female ,  32 weeks gestation presents for emergent evaluation status post a fall that happened this evening.  Patient was pulled to the ground while walking to 80 lb dogs and when she fell landed on her stomach on a patch of grass.  She is here reporting decreased fetal movement.      Urgent considerations include uterine rupture, fetal demise,  labor.     Fetal heart tones assessed and are 150s to 160s with Doppler.  Palpated uterus and it was firm and above umbilicus.  She does not have any lower abdominal tenderness on exam.  Denies vaginal bleeding.  Denies back pain.  Denies any other injuries.  Denies hitting head.  Denies losing consciousness.  Patient is hemodynamically stable and in no acute distress.  She is cleared from an emergent standpoint.  Patient transferred to labor and delivery for further assessment.  I discussed the case with Dr. North.   The likelihood of other entities in the differential is insufficient to  justify any further testing for them.  This was explained to the patient. The patient was advised that persistent or worsening symptoms would require further evaluations. Returned precautions discussed and discharge plan reviewed. Patient verbalizes understanding and when to return the emergency room.                                 Clinical Impression:   Final diagnoses:  [Z36.89] Encounter for triage in pregnant patient        ED Disposition Condition    Discharge Stable          ED Prescriptions    None       Follow-up Information    None          Evonne Hernandez NP  10/15/23 [id number]       Evonne Hernandez NP  10/15/23 7193

## 2023-10-16 NOTE — NURSING
[facility]  Department of Obstetrics and Gynecology  Labor & Delivery Triage Assessment    PATIENT NAME: Meghana Munoz  MRN: [id number]  TODAY'S DATE: 10/15/2023    CHIEF COMPLAINT: Fall (Pt states was walking dogs and they pulled her to the ground. Fell on belly in grass. States she had left sided abdominal pain for 5 min following fall which has resolved. Denies LOF or vaginal bleeding. ) and Abdominal Pain (NO VAG BLEED)      OB History    Para Term  AB Living   1 0 0 0 0 0   SAB IAB Ectopic Multiple Live Births   0 0 0 0 0      # Outcome Date GA Lbr Serafin/2nd Weight Sex Delivery Anes PTL Lv   1 Current              Past Medical History:   Diagnosis Date    Mixed hyperlipidemia      History reviewed. No pertinent surgical history.      VITAL SIGNS - ABNORMAL VITALS INCLUDE TEMP >100.4,RR <12 or >26, SUSTAINED MATERNAL PULSE <60 or >120     VITAL SIGNS (Most Recent)  Temp: 98.5 °F (36.9 °C) (10/15/23 1829)  Pulse: 86 (10/15/23 1922)  Resp: 18 (10/15/23 1922)  BP: (!) 128/58 (10/15/23 1922)  SpO2: 98 % (10/15/23 1829)    VITAL SIGNS     normal  HEADACHE    no     VOMITING    no  VISUAL DISTURBANCES  no  EPIGASTRIC PAIN        no  PROTEINURIA 2+ or MORE             N/a   EDEMA FACE/EXTREMITIES            no    FETAL MOVEMENT     FETAL MOVEMENT: Decreased  FETAL HEART RATE BASELINE =  135  normal  FETAL HEART RATE VARIABILITY:  Moderate  FETAL HEART RATE ACCELERATIONS FOR GESTATIONAL AGE: present  FETAL HEART RATE DECELERATIONS: variable    ABDOMINAL PAIN/CRAMPING/CONTRACTIONS     Patient is not complaining of abdominal pain/cramping/contractions.    RUPTURE OF MEMBRANES OR LEAKING OF AMNIOTIC FLUID     Patient denies ROM or leaking of amniotic fluid.    VAGINAL BLEEDING     Patient denies vaginal bleeding.    VAGINAL EXAM     DILATION:  deferred  STATION:  deferred  EFFACEMENT:  deferred  PRESENTATION:  deferred    VAGINAL EXAM DEFERRED DUE TO:  Not in Labor    PAIN PRESENT ON ARRIVAL      ONSET:   Denies pain at time of arrival    LOCATION:  None   PAIN SCALE (0-10):  0  DESCRIPTION: N/a    Interventions     Ultrasound for BPP and continuous EFM       PATIENT DISPOSITION     Discharged Home, left AMA     1917: Pt arrives to unit from ED. Continuous EFM initiated and assessment completed. Pt states no longer experiencing abdominal pain but has not felt baby moving as much as usual since the fall. Provided pt with clicker to record when feeling movements.      Dr. Orosco notified at 1944 of the above assessment. Pt reports feeling numerous movements since arriving to unit. Orders received for BPP and 6 hours of monitoring.     1945: Updated pt on POC, states that she will not have a ride home after 9:30 pm. Educated pt on need for monitoring after a fall on the abdomen and that in order to leave she will have to leave against medical advice. Pt verbalizes understanding and states she will stay for a little while longer.     2009: US at bedside for BPP.     2035: Updated Dr. Orosco on US findings and informed her of pts desire to leave. She restates need for continuous EFM for at least 6 hours following fall.     2040: Returned to bedside with 2nd RN to make sure pt aware of need to stay for continuous monitoring. Pt verbalizes understanding but states she will not be able to stay. Explained pt will be considered leaving against medical advice and the importance of returning for any change or worsening of condition. Pt verbalizes understanding, against medical advice form signed.     2051: Informed Dr. Orosco of pt decision to leave against medical advice.     2052: Pt d/c from unit.         Dawn Barahona, RN  [facility]  10/15/2023              yes

## 2023-10-29 ENCOUNTER — HOSPITAL ENCOUNTER (OUTPATIENT)
Facility: HOSPITAL | Age: 23
Discharge: HOME | End: 2023-10-29
Attending: STUDENT IN AN ORGANIZED HEALTH CARE EDUCATION/TRAINING PROGRAM | Admitting: OBSTETRICS & GYNECOLOGY
Payer: MEDICAID

## 2023-10-29 VITALS
DIASTOLIC BLOOD PRESSURE: 55 MMHG | RESPIRATION RATE: 18 BRPM | OXYGEN SATURATION: 98 % | SYSTOLIC BLOOD PRESSURE: 108 MMHG | TEMPERATURE: 98 F | HEART RATE: 86 BPM

## 2023-10-29 DIAGNOSIS — N85.8 ALTERATION IN COMFORT ASSOCIATED WITH UTERINE CONTRACTIONS: ICD-10-CM

## 2023-10-29 LAB
BILIRUB UR QL STRIP: NEGATIVE
CLARITY UR: CLEAR
COLOR UR: YELLOW
GLUCOSE UR QL STRIP: NEGATIVE
HGB UR QL STRIP: NEGATIVE
KETONES UR QL STRIP: ABNORMAL
LEUKOCYTE ESTERASE UR QL STRIP: NEGATIVE
NITRITE UR QL STRIP: NEGATIVE
PH UR STRIP: 7 [PH] (ref 5–8)
PROT UR QL STRIP: ABNORMAL
SP GR UR STRIP: 1.02 (ref 1–1.03)
URN SPEC COLLECT METH UR: ABNORMAL
UROBILINOGEN UR STRIP-ACNC: NEGATIVE EU/DL

## 2023-10-29 PROCEDURE — 81003 URINALYSIS AUTO W/O SCOPE: CPT | Performed by: STUDENT IN AN ORGANIZED HEALTH CARE EDUCATION/TRAINING PROGRAM

## 2023-10-29 PROCEDURE — 99211 OFF/OP EST MAY X REQ PHY/QHP: CPT

## 2023-10-29 RX ORDER — PROCHLORPERAZINE EDISYLATE 5 MG/ML
5 INJECTION INTRAMUSCULAR; INTRAVENOUS EVERY 6 HOURS PRN
Status: DISCONTINUED | OUTPATIENT
Start: 2023-10-29 | End: 2023-10-29 | Stop reason: HOSPADM

## 2023-10-29 RX ORDER — ONDANSETRON 4 MG/1
8 TABLET, ORALLY DISINTEGRATING ORAL EVERY 8 HOURS PRN
Status: DISCONTINUED | OUTPATIENT
Start: 2023-10-29 | End: 2023-10-29 | Stop reason: HOSPADM

## 2023-10-29 RX ORDER — SODIUM CHLORIDE, SODIUM LACTATE, POTASSIUM CHLORIDE, CALCIUM CHLORIDE 600; 310; 30; 20 MG/100ML; MG/100ML; MG/100ML; MG/100ML
INJECTION, SOLUTION INTRAVENOUS CONTINUOUS
Status: DISCONTINUED | OUTPATIENT
Start: 2023-10-29 | End: 2023-10-29 | Stop reason: HOSPADM

## 2023-10-29 RX ORDER — ACETAMINOPHEN 500 MG
500 TABLET ORAL EVERY 6 HOURS PRN
Status: DISCONTINUED | OUTPATIENT
Start: 2023-10-29 | End: 2023-10-29 | Stop reason: HOSPADM

## 2023-10-29 NOTE — NURSING
[facility]  Department of Obstetrics and Gynecology  Labor & Delivery Triage Assessment    PATIENT NAME: Meghana Munoz  MRN: [id number]  TODAY'S DATE: 10/29/2023    CHIEF COMPLAINT: Contractions      OB History    Para Term  AB Living   1 0 0 0 0 0   SAB IAB Ectopic Multiple Live Births   0 0 0 0 0      # Outcome Date GA Lbr Serafin/2nd Weight Sex Delivery Anes PTL Lv   1 Current              Past Medical History:   Diagnosis Date    Mixed hyperlipidemia      No past surgical history on file.      VITAL SIGNS - ABNORMAL VITALS INCLUDE TEMP >100.4,RR <12 or >26, SUSTAINED MATERNAL PULSE <60 or >120     VITAL SIGNS (Most Recent)  Temp: 98.2 °F (36.8 °C) (10/29/23 0353)  Pulse: 82 (10/29/23 0403)  Resp: 18 (10/29/23 0353)  BP: (!) 116/58 (10/29/23 0353)  SpO2: 98 % (10/29/23 0403)    VITAL SIGNS     normal  HEADACHE    no     VOMITING    no  VISUAL DISTURBANCES  no  EPIGASTRIC PAIN        no  PROTEINURIA 2+ or MORE             no   EDEMA FACE/EXTREMITIES            no    FETAL MOVEMENT     FETAL MOVEMENT: Active  FETAL HEART RATE BASELINE =  125  normal  FETAL HEART RATE VARIABILITY:  Moderate  FETAL HEART RATE ACCELERATIONS FOR GESTATIONAL AGE: present  FETAL HEART RATE DECELERATIONS: none    ABDOMINAL PAIN/CRAMPING/CONTRACTIONS     Patient is complaining of abdominal pain/cramping/contractions. For  patients  irritability  contractions/hour.   Resting tone is relaxed. Abdominal palpation is non-tender.    RUPTURE OF MEMBRANES OR LEAKING OF AMNIOTIC FLUID     Patient denies ROM or leaking of amniotic fluid.    VAGINAL BLEEDING     Patient denies vaginal bleeding.    VAGINAL EXAM     DILATION:    STATION:    EFFACEMENT:    PRESENTATION:      VAGINAL EXAM DEFERRED DUE TO:  Not in Labor    PAIN PRESENT ON ARRIVAL     ONSET:   After cleaning her home  LOCATION:  abdomen  PAIN SCALE (0-10):  2  DESCRIPTION: cramping    Interventions     None.      PATIENT DISPOSITION     Discharged  Home      Dr. Acosta notified at 0416 of the above assessment.    Tri Santiago, RN  [facility]  10/29/2023

## 2023-10-29 NOTE — DISCHARGE INSTRUCTIONS
Call [phone] for vaginal bleeding, leakage of fluids, contractions 4-5 in one hour, decreased fetal movements ( 10 kicks in 2 hours), headache not relieved by Tylenol, blurry vision, or temp of 100.4 or greater.  Begin doing fetal kick counts, at least 10 movements in 2 hours starting at 28 weeks gestation.  Keep next clinic appointment

## 2023-11-10 LAB — PRENATAL STREP B CULTURE: POSITIVE

## 2023-11-20 ENCOUNTER — HOSPITAL ENCOUNTER (OUTPATIENT)
Facility: HOSPITAL | Age: 23
Discharge: HOME | End: 2023-11-20
Attending: OBSTETRICS & GYNECOLOGY | Admitting: OBSTETRICS & GYNECOLOGY
Payer: MEDICAID

## 2023-11-20 VITALS — HEART RATE: 108 BPM | DIASTOLIC BLOOD PRESSURE: 52 MMHG | SYSTOLIC BLOOD PRESSURE: 118 MMHG | RESPIRATION RATE: 18 BRPM

## 2023-11-20 DIAGNOSIS — O36.8190 DECREASED FETAL MOVEMENT: ICD-10-CM

## 2023-11-20 PROCEDURE — 59025 FETAL NON-STRESS TEST: CPT

## 2023-12-05 DIAGNOSIS — Z34.90 ENCOUNTER FOR INDUCTION OF LABOR: Primary | ICD-10-CM

## 2023-12-06 ENCOUNTER — PATIENT OUTREACH (OUTPATIENT)
Dept: EMERGENCY MEDICINE | Facility: HOSPITAL | Age: 23
End: 2023-12-06

## 2023-12-07 ENCOUNTER — HOSPITAL ENCOUNTER (INPATIENT)
Facility: HOSPITAL | Age: 23
LOS: 4 days | Discharge: HOME | End: 2023-12-11
Attending: OBSTETRICS & GYNECOLOGY | Admitting: OBSTETRICS & GYNECOLOGY
Payer: MEDICAID

## 2023-12-07 DIAGNOSIS — Z3A.39 39 WEEKS GESTATION OF PREGNANCY: ICD-10-CM

## 2023-12-07 DIAGNOSIS — O99.820 GBS (GROUP B STREPTOCOCCUS CARRIER), +RV CULTURE, CURRENTLY PREGNANT: ICD-10-CM

## 2023-12-07 DIAGNOSIS — D64.9 ANEMIA, UNSPECIFIED TYPE: ICD-10-CM

## 2023-12-07 DIAGNOSIS — E66.01 MORBID OBESITY: Primary | ICD-10-CM

## 2023-12-07 DIAGNOSIS — Z34.90 ENCOUNTER FOR INDUCTION OF LABOR: ICD-10-CM

## 2023-12-07 LAB
ABO + RH BLD: NORMAL
AMPHET+METHAMPHET UR QL: NEGATIVE
BARBITURATES UR QL SCN>200 NG/ML: NEGATIVE
BASOPHILS # BLD AUTO: 0.02 K/UL (ref 0–0.2)
BASOPHILS NFR BLD: 0.3 % (ref 0–1.9)
BENZODIAZ UR QL SCN>200 NG/ML: NEGATIVE
BILIRUB UR QL STRIP: NEGATIVE
BLD GP AB SCN CELLS X3 SERPL QL: NORMAL
BUPRENORPHINE UR QL: NEGATIVE
BZE UR QL SCN: NEGATIVE
CANNABINOIDS UR QL SCN: NEGATIVE
CLARITY UR: ABNORMAL
COLOR UR: YELLOW
CREAT UR-MCNC: 125 MG/DL (ref 15–325)
CREAT UR-MCNC: 125.9 MG/DL (ref 15–325)
DIFFERENTIAL METHOD: ABNORMAL
EOSINOPHIL # BLD AUTO: 0.2 K/UL (ref 0–0.5)
EOSINOPHIL NFR BLD: 3.1 % (ref 0–8)
ERYTHROCYTE [DISTWIDTH] IN BLOOD BY AUTOMATED COUNT: 14.3 % (ref 11.5–14.5)
GLUCOSE UR QL STRIP: NEGATIVE
HCT VFR BLD AUTO: 32.8 % (ref 37–48.5)
HGB BLD-MCNC: 10.5 G/DL (ref 12–16)
HGB UR QL STRIP: NEGATIVE
IMM GRANULOCYTES # BLD AUTO: 0.02 K/UL (ref 0–0.04)
IMM GRANULOCYTES NFR BLD AUTO: 0.3 % (ref 0–0.5)
KETONES UR QL STRIP: NEGATIVE
LEUKOCYTE ESTERASE UR QL STRIP: NEGATIVE
LYMPHOCYTES # BLD AUTO: 1.2 K/UL (ref 1–4.8)
LYMPHOCYTES NFR BLD: 16.8 % (ref 18–48)
MCH RBC QN AUTO: 27.1 PG (ref 27–31)
MCHC RBC AUTO-ENTMCNC: 32 G/DL (ref 32–36)
MCV RBC AUTO: 85 FL (ref 82–98)
MONOCYTES # BLD AUTO: 0.4 K/UL (ref 0.3–1)
MONOCYTES NFR BLD: 5.7 % (ref 4–15)
NEUTROPHILS # BLD AUTO: 5.2 K/UL (ref 1.8–7.7)
NEUTROPHILS NFR BLD: 73.8 % (ref 38–73)
NITRITE UR QL STRIP: NEGATIVE
NRBC BLD-RTO: 0 /100 WBC
OPIATES UR QL SCN: NEGATIVE
PCP UR QL SCN>25 NG/ML: NEGATIVE
PH UR STRIP: 6 [PH] (ref 5–8)
PLATELET # BLD AUTO: 237 K/UL (ref 150–450)
PMV BLD AUTO: 10.5 FL (ref 9.2–12.9)
PROT UR QL STRIP: ABNORMAL
RBC # BLD AUTO: 3.88 M/UL (ref 4–5.4)
SP GR UR STRIP: 1.02 (ref 1–1.03)
TOXICOLOGY INFORMATION: NORMAL
URN SPEC COLLECT METH UR: ABNORMAL
UROBILINOGEN UR STRIP-ACNC: NEGATIVE EU/DL
WBC # BLD AUTO: 7.07 K/UL (ref 3.9–12.7)

## 2023-12-07 PROCEDURE — 85025 COMPLETE CBC W/AUTO DIFF WBC: CPT | Performed by: OBSTETRICS & GYNECOLOGY

## 2023-12-07 PROCEDURE — 86592 SYPHILIS TEST NON-TREP QUAL: CPT | Performed by: OBSTETRICS & GYNECOLOGY

## 2023-12-07 PROCEDURE — 12000002 HC ACUTE/MED SURGE SEMI-PRIVATE ROOM

## 2023-12-07 PROCEDURE — 86850 RBC ANTIBODY SCREEN: CPT | Performed by: OBSTETRICS & GYNECOLOGY

## 2023-12-07 PROCEDURE — 80307 DRUG TEST PRSMV CHEM ANLYZR: CPT | Performed by: OBSTETRICS & GYNECOLOGY

## 2023-12-07 PROCEDURE — 25000003 PHARM REV CODE 250: Performed by: OBSTETRICS & GYNECOLOGY

## 2023-12-07 PROCEDURE — 81003 URINALYSIS AUTO W/O SCOPE: CPT | Mod: 59 | Performed by: OBSTETRICS & GYNECOLOGY

## 2023-12-07 PROCEDURE — 80348 DRUG SCREENING BUPRENORPHINE: CPT | Performed by: OBSTETRICS & GYNECOLOGY

## 2023-12-07 RX ORDER — SODIUM CHLORIDE, SODIUM LACTATE, POTASSIUM CHLORIDE, CALCIUM CHLORIDE 600; 310; 30; 20 MG/100ML; MG/100ML; MG/100ML; MG/100ML
INJECTION, SOLUTION INTRAVENOUS CONTINUOUS
Status: DISCONTINUED | OUTPATIENT
Start: 2023-12-07 | End: 2023-12-09

## 2023-12-07 RX ORDER — OXYTOCIN/RINGER'S LACTATE 30/500 ML
95 PLASTIC BAG, INJECTION (ML) INTRAVENOUS ONCE AS NEEDED
Status: DISCONTINUED | OUTPATIENT
Start: 2023-12-07 | End: 2023-12-09

## 2023-12-07 RX ORDER — DIPHENOXYLATE HYDROCHLORIDE AND ATROPINE SULFATE 2.5; .025 MG/1; MG/1
2 TABLET ORAL EVERY 6 HOURS PRN
Status: DISCONTINUED | OUTPATIENT
Start: 2023-12-07 | End: 2023-12-09

## 2023-12-07 RX ORDER — ROPIVACAINE HYDROCHLORIDE 2 MG/ML
20 INJECTION, SOLUTION EPIDURAL; INFILTRATION ONCE AS NEEDED
Status: DISCONTINUED | OUTPATIENT
Start: 2023-12-07 | End: 2023-12-09

## 2023-12-07 RX ORDER — LIDOCAINE HYDROCHLORIDE 10 MG/ML
10 INJECTION INFILTRATION; PERINEURAL ONCE AS NEEDED
Status: DISCONTINUED | OUTPATIENT
Start: 2023-12-07 | End: 2023-12-09

## 2023-12-07 RX ORDER — CARBOPROST TROMETHAMINE 250 UG/ML
250 INJECTION, SOLUTION INTRAMUSCULAR
Status: DISCONTINUED | OUTPATIENT
Start: 2023-12-07 | End: 2023-12-09

## 2023-12-07 RX ORDER — OXYTOCIN/RINGER'S LACTATE 30/500 ML
334 PLASTIC BAG, INJECTION (ML) INTRAVENOUS ONCE AS NEEDED
Status: COMPLETED | OUTPATIENT
Start: 2023-12-07 | End: 2023-12-09

## 2023-12-07 RX ORDER — MISOPROSTOL 200 UG/1
800 TABLET ORAL ONCE AS NEEDED
Status: DISCONTINUED | OUTPATIENT
Start: 2023-12-07 | End: 2023-12-09

## 2023-12-07 RX ORDER — OXYTOCIN/RINGER'S LACTATE 30/500 ML
334 PLASTIC BAG, INJECTION (ML) INTRAVENOUS ONCE
Status: DISCONTINUED | OUTPATIENT
Start: 2023-12-07 | End: 2023-12-09

## 2023-12-07 RX ORDER — CALCIUM CARBONATE 200(500)MG
500 TABLET,CHEWABLE ORAL 3 TIMES DAILY PRN
Status: DISCONTINUED | OUTPATIENT
Start: 2023-12-07 | End: 2023-12-09

## 2023-12-07 RX ORDER — OXYTOCIN 10 [USP'U]/ML
10 INJECTION, SOLUTION INTRAMUSCULAR; INTRAVENOUS ONCE AS NEEDED
Status: DISCONTINUED | OUTPATIENT
Start: 2023-12-07 | End: 2023-12-09

## 2023-12-07 RX ORDER — METHYLERGONOVINE MALEATE 0.2 MG/ML
200 INJECTION INTRAVENOUS ONCE AS NEEDED
Status: DISCONTINUED | OUTPATIENT
Start: 2023-12-07 | End: 2023-12-09

## 2023-12-07 RX ORDER — TRANEXAMIC ACID 10 MG/ML
1000 INJECTION, SOLUTION INTRAVENOUS EVERY 30 MIN PRN
Status: DISCONTINUED | OUTPATIENT
Start: 2023-12-07 | End: 2023-12-09

## 2023-12-07 RX ORDER — FENTANYL/BUPIVACAINE/NS/PF 2MCG/ML-.1
PLASTIC BAG, INJECTION (ML) INJECTION CONTINUOUS
Status: DISCONTINUED | OUTPATIENT
Start: 2023-12-07 | End: 2023-12-09

## 2023-12-07 RX ORDER — SIMETHICONE 80 MG
1 TABLET,CHEWABLE ORAL 4 TIMES DAILY PRN
Status: DISCONTINUED | OUTPATIENT
Start: 2023-12-07 | End: 2023-12-09

## 2023-12-07 RX ADMIN — DINOPROSTONE 10 MG: 10 INSERT VAGINAL at 04:12

## 2023-12-08 ENCOUNTER — ANESTHESIA EVENT (OUTPATIENT)
Dept: OBSTETRICS AND GYNECOLOGY | Facility: HOSPITAL | Age: 23
End: 2023-12-08
Payer: MEDICAID

## 2023-12-08 ENCOUNTER — ANESTHESIA (OUTPATIENT)
Dept: OBSTETRICS AND GYNECOLOGY | Facility: HOSPITAL | Age: 23
End: 2023-12-08
Payer: MEDICAID

## 2023-12-08 PROBLEM — Z3A.39 39 WEEKS GESTATION OF PREGNANCY: Status: ACTIVE | Noted: 2023-12-08

## 2023-12-08 PROBLEM — E66.01 MORBID OBESITY: Status: ACTIVE | Noted: 2023-12-08

## 2023-12-08 PROBLEM — O99.820 GBS (GROUP B STREPTOCOCCUS CARRIER), +RV CULTURE, CURRENTLY PREGNANT: Status: ACTIVE | Noted: 2023-12-08

## 2023-12-08 PROBLEM — D64.9 ANEMIA: Status: ACTIVE | Noted: 2023-12-08

## 2023-12-08 PROCEDURE — 63600175 PHARM REV CODE 636 W HCPCS: Performed by: OBSTETRICS & GYNECOLOGY

## 2023-12-08 PROCEDURE — 63600175 PHARM REV CODE 636 W HCPCS: Performed by: STUDENT IN AN ORGANIZED HEALTH CARE EDUCATION/TRAINING PROGRAM

## 2023-12-08 PROCEDURE — 12000002 HC ACUTE/MED SURGE SEMI-PRIVATE ROOM

## 2023-12-08 PROCEDURE — C1751 CATH, INF, PER/CENT/MIDLINE: HCPCS | Performed by: STUDENT IN AN ORGANIZED HEALTH CARE EDUCATION/TRAINING PROGRAM

## 2023-12-08 PROCEDURE — 59409 OBSTETRICAL CARE: CPT | Mod: AA,,, | Performed by: STUDENT IN AN ORGANIZED HEALTH CARE EDUCATION/TRAINING PROGRAM

## 2023-12-08 PROCEDURE — 27200710 HC EPIDURAL INFUSION PUMP SET: Performed by: STUDENT IN AN ORGANIZED HEALTH CARE EDUCATION/TRAINING PROGRAM

## 2023-12-08 PROCEDURE — 59409 PRA ETRICAL CARE,VAG DELIV ONLY: ICD-10-PCS | Mod: AA,,, | Performed by: STUDENT IN AN ORGANIZED HEALTH CARE EDUCATION/TRAINING PROGRAM

## 2023-12-08 PROCEDURE — 25000003 PHARM REV CODE 250: Performed by: OBSTETRICS & GYNECOLOGY

## 2023-12-08 PROCEDURE — 62326 NJX INTERLAMINAR LMBR/SAC: CPT | Performed by: STUDENT IN AN ORGANIZED HEALTH CARE EDUCATION/TRAINING PROGRAM

## 2023-12-08 PROCEDURE — 51702 INSERT TEMP BLADDER CATH: CPT

## 2023-12-08 RX ORDER — NALOXONE HCL 0.4 MG/ML
0.4 VIAL (ML) INJECTION SEE ADMIN INSTRUCTIONS
Status: DISCONTINUED | OUTPATIENT
Start: 2023-12-08 | End: 2023-12-09

## 2023-12-08 RX ORDER — ONDANSETRON 2 MG/ML
4 INJECTION INTRAMUSCULAR; INTRAVENOUS EVERY 6 HOURS PRN
Status: COMPLETED | OUTPATIENT
Start: 2023-12-08 | End: 2023-12-09

## 2023-12-08 RX ORDER — FENTANYL/BUPIVACAINE/NS/PF 2MCG/ML-.1
14 PLASTIC BAG, INJECTION (ML) INJECTION CONTINUOUS
Status: DISCONTINUED | OUTPATIENT
Start: 2023-12-08 | End: 2023-12-09

## 2023-12-08 RX ORDER — DIPHENHYDRAMINE HYDROCHLORIDE 50 MG/ML
12.5 INJECTION INTRAMUSCULAR; INTRAVENOUS EVERY 4 HOURS PRN
Status: DISCONTINUED | OUTPATIENT
Start: 2023-12-08 | End: 2023-12-09

## 2023-12-08 RX ORDER — EPHEDRINE SULFATE 50 MG/ML
10 INJECTION, SOLUTION INTRAVENOUS ONCE AS NEEDED
Status: DISCONTINUED | OUTPATIENT
Start: 2023-12-08 | End: 2023-12-09

## 2023-12-08 RX ORDER — OXYTOCIN/RINGER'S LACTATE 30/500 ML
0-30 PLASTIC BAG, INJECTION (ML) INTRAVENOUS CONTINUOUS
Status: DISCONTINUED | OUTPATIENT
Start: 2023-12-08 | End: 2023-12-09

## 2023-12-08 RX ORDER — ACETAMINOPHEN 500 MG
1000 TABLET ORAL EVERY 6 HOURS PRN
Status: DISCONTINUED | OUTPATIENT
Start: 2023-12-08 | End: 2023-12-09

## 2023-12-08 RX ADMIN — DEXTROSE 3 MILLION UNITS: 50 INJECTION, SOLUTION INTRAVENOUS at 01:12

## 2023-12-08 RX ADMIN — SODIUM CHLORIDE, SODIUM LACTATE, POTASSIUM CHLORIDE, AND CALCIUM CHLORIDE 1000 ML: .6; .31; .03; .02 INJECTION, SOLUTION INTRAVENOUS at 01:12

## 2023-12-08 RX ADMIN — SODIUM CHLORIDE, SODIUM LACTATE, POTASSIUM CHLORIDE, AND CALCIUM CHLORIDE: .6; .31; .03; .02 INJECTION, SOLUTION INTRAVENOUS at 04:12

## 2023-12-08 RX ADMIN — SODIUM CHLORIDE, SODIUM LACTATE, POTASSIUM CHLORIDE, AND CALCIUM CHLORIDE: .6; .31; .03; .02 INJECTION, SOLUTION INTRAVENOUS at 07:12

## 2023-12-08 RX ADMIN — ONDANSETRON 4 MG: 2 INJECTION INTRAMUSCULAR; INTRAVENOUS at 06:12

## 2023-12-08 RX ADMIN — SODIUM CHLORIDE, SODIUM LACTATE, POTASSIUM CHLORIDE, AND CALCIUM CHLORIDE: .6; .31; .03; .02 INJECTION, SOLUTION INTRAVENOUS at 02:12

## 2023-12-08 RX ADMIN — DEXTROSE MONOHYDRATE 5 MILLION UNITS: 5 INJECTION INTRAVENOUS at 08:12

## 2023-12-08 RX ADMIN — Medication 2 MILLI-UNITS/MIN: at 04:12

## 2023-12-08 RX ADMIN — DEXTROSE 3 MILLION UNITS: 50 INJECTION, SOLUTION INTRAVENOUS at 05:12

## 2023-12-08 RX ADMIN — ACETAMINOPHEN 1000 MG: 500 TABLET ORAL at 12:12

## 2023-12-08 RX ADMIN — DEXTROSE 3 MILLION UNITS: 50 INJECTION, SOLUTION INTRAVENOUS at 08:12

## 2023-12-08 NOTE — ANESTHESIA PROCEDURE NOTES
Epidural    Patient location during procedure: OB   Reason for block: primary anesthetic   Reason for block: labor analgesia requested by patient and obstetrician  Diagnosis: Intrauterine Pregnancy   Start time: 12/8/2023 2:34 PM  Timeout: 12/8/2023 2:34 PM  End time: 12/8/2023 2:34 PM    Staffing  Performing Provider: Santi Garcia MD  Authorizing Provider: Santi Garcia MD    Staffing  Performed by: Santi Garcia MD  Authorized by: Santi Garcia MD        Preanesthetic Checklist  Completed: patient identified, IV checked, risks and benefits discussed, monitors and equipment checked, pre-op evaluation, timeout performed, anesthesia consent given, hand hygiene performed and patient being monitored  Preparation  Patient position: sitting  Prep: ChloraPrep  Patient monitoring: ECG and Blood Pressure  Reason for block: primary anesthetic   Epidural  Skin Anesthetic: lidocaine 1%  Skin Wheal: 3 mL  Administration type: single shot  Approach: midline  Interspace: L3-4    Injection technique: TRACY air  Needle and Epidural Catheter  Needle type: Tuohy   Needle gauge: 17  Needle length: 3.5 inches  Needle insertion depth: 9 cm  Catheter type: springwound and multi-orifice  Catheter size: 18 G  Catheter at skin depth: 20 cm  Insertion Attempts: 2  Test dose: 3 mL of lidocaine 1.5% with Epi 1-to-200,000  Additional Documentation: negative aspiration for heme and CSF, incremental injection, no signs/symptoms of IV or SA injection, no significant complaints from patient, no paresthesia on injection and no significant pain on injection  Needle localization: anatomical landmarks  Medications:  Volume per aspiration: 5 mL   Assessment  Ease of block: moderate  Patient's tolerance of the procedure: comfortable throughout block and no complaints  Additional Notes  Blood aspirated from first catheter,  removed and replaced with new catheter with needle still in place, rotated 180 degrees.  No blood able to be  aspirated.  10 ml of 0.2% Ropivacaine given through epidural catheter in 5 ml increments.    No inadvertent dural puncture with Tuohy.  Dural puncture not performed with spinal needle

## 2023-12-08 NOTE — HPI
G1 at 39.6 weeks for IOL, aware of not wilfred favorable cx and requests to proceed.  +GBS.  Anemia.

## 2023-12-08 NOTE — ANESTHESIA PREPROCEDURE EVALUATION
12/08/2023  Meghana Munoz is a 23 y.o., female.      Patient Active Problem List   Diagnosis    39 weeks gestation of pregnancy    GBS (group B Streptococcus carrier), +RV culture, currently pregnant    Anemia    Morbid obesity       History reviewed. No pertinent surgical history.     Tobacco Use:  The patient  reports that she has never smoked. She has never used smokeless tobacco.     No results found for this or any previous visit.          Lab Results   Component Value Date    WBC 7.07 12/07/2023    HGB 10.5 (L) 12/07/2023    HCT 32.8 (L) 12/07/2023    MCV 85 12/07/2023     12/07/2023     BMP  Lab Results   Component Value Date     (L) 06/17/2023    K 3.6 06/17/2023     06/17/2023    CO2 22 (L) 06/17/2023    BUN 6 06/17/2023    CREATININE 0.5 06/17/2023    CALCIUM 8.5 (L) 06/17/2023    ANIONGAP 7 (L) 06/17/2023    GLU 95 06/17/2023    GLU 94 05/19/2023       No results found for this or any previous visit.          Pre-op Assessment    I have reviewed the Patient Summary Reports.     I have reviewed the Nursing Notes. I have reviewed the NPO Status.   I have reviewed the Medications.     Review of Systems  Anesthesia Hx:  No problems with previous Anesthesia             Denies Family Hx of Anesthesia complications.    Denies Personal Hx of Anesthesia complications.                    Social:  Non-Smoker       Hematology/Oncology:  Hematology Normal   Oncology Normal                                   EENT/Dental:  EENT/Dental Normal           Cardiovascular:  Cardiovascular Normal                  ECG has been reviewed.                          Pulmonary:  Pulmonary Normal                       Renal/:  Renal/ Normal                 Hepatic/GI:  Hepatic/GI Normal                 Musculoskeletal:  Musculoskeletal Normal                Neurological:  Neurology Normal                                       Endocrine:  Endocrine Normal            Psych:  Psychiatric Normal                    Physical Exam  General: Well nourished, Cooperative, Alert and Oriented    Airway:  Mallampati: II   Mouth Opening: Normal  TM Distance: Normal  Tongue: Normal  Neck ROM: Normal ROM    Dental:  Intact    Chest/Lungs:  Clear to auscultation    Heart:  Rate: Normal  Rhythm: Regular Rhythm  Sounds: Normal        Anesthesia Plan  Type of Anesthesia, risks & benefits discussed:    Anesthesia Type: Epidural  Intra-op Monitoring Plan: Standard ASA Monitors  Informed Consent: Informed consent signed with the Patient and all parties understand the risks and agree with anesthesia plan.  All questions answered.   ASA Score: 2    Ready For Surgery From Anesthesia Perspective.     .

## 2023-12-09 LAB — RPR SER QL: NORMAL

## 2023-12-09 PROCEDURE — 25000003 PHARM REV CODE 250: Performed by: OBSTETRICS & GYNECOLOGY

## 2023-12-09 PROCEDURE — 12000002 HC ACUTE/MED SURGE SEMI-PRIVATE ROOM

## 2023-12-09 PROCEDURE — 63600175 PHARM REV CODE 636 W HCPCS: Performed by: OBSTETRICS & GYNECOLOGY

## 2023-12-09 PROCEDURE — 72200005 HC VAGINAL DELIVERY LEVEL II

## 2023-12-09 PROCEDURE — 63600175 PHARM REV CODE 636 W HCPCS: Performed by: STUDENT IN AN ORGANIZED HEALTH CARE EDUCATION/TRAINING PROGRAM

## 2023-12-09 RX ORDER — DIPHENHYDRAMINE HCL 25 MG
25 CAPSULE ORAL EVERY 4 HOURS PRN
Status: DISCONTINUED | OUTPATIENT
Start: 2023-12-09 | End: 2023-12-11 | Stop reason: HOSPADM

## 2023-12-09 RX ORDER — DIPHENOXYLATE HYDROCHLORIDE AND ATROPINE SULFATE 2.5; .025 MG/1; MG/1
2 TABLET ORAL EVERY 6 HOURS PRN
Status: DISCONTINUED | OUTPATIENT
Start: 2023-12-09 | End: 2023-12-11 | Stop reason: HOSPADM

## 2023-12-09 RX ORDER — OXYTOCIN 10 [USP'U]/ML
10 INJECTION, SOLUTION INTRAMUSCULAR; INTRAVENOUS ONCE AS NEEDED
Status: DISCONTINUED | OUTPATIENT
Start: 2023-12-09 | End: 2023-12-11 | Stop reason: HOSPADM

## 2023-12-09 RX ORDER — ONDANSETRON 4 MG/1
8 TABLET, ORALLY DISINTEGRATING ORAL EVERY 8 HOURS PRN
Status: DISCONTINUED | OUTPATIENT
Start: 2023-12-09 | End: 2023-12-11 | Stop reason: HOSPADM

## 2023-12-09 RX ORDER — HYDROCODONE BITARTRATE AND ACETAMINOPHEN 10; 325 MG/1; MG/1
1 TABLET ORAL EVERY 4 HOURS PRN
Status: DISCONTINUED | OUTPATIENT
Start: 2023-12-09 | End: 2023-12-11 | Stop reason: HOSPADM

## 2023-12-09 RX ORDER — ACETAMINOPHEN 325 MG/1
650 TABLET ORAL EVERY 6 HOURS PRN
Status: DISCONTINUED | OUTPATIENT
Start: 2023-12-09 | End: 2023-12-11 | Stop reason: HOSPADM

## 2023-12-09 RX ORDER — TRANEXAMIC ACID 10 MG/ML
1000 INJECTION, SOLUTION INTRAVENOUS EVERY 30 MIN PRN
Status: DISCONTINUED | OUTPATIENT
Start: 2023-12-09 | End: 2023-12-11 | Stop reason: HOSPADM

## 2023-12-09 RX ORDER — OXYTOCIN/RINGER'S LACTATE 30/500 ML
95 PLASTIC BAG, INJECTION (ML) INTRAVENOUS ONCE AS NEEDED
Status: DISCONTINUED | OUTPATIENT
Start: 2023-12-09 | End: 2023-12-11 | Stop reason: HOSPADM

## 2023-12-09 RX ORDER — DIPHENHYDRAMINE HYDROCHLORIDE 50 MG/ML
25 INJECTION INTRAMUSCULAR; INTRAVENOUS EVERY 4 HOURS PRN
Status: DISCONTINUED | OUTPATIENT
Start: 2023-12-09 | End: 2023-12-11 | Stop reason: HOSPADM

## 2023-12-09 RX ORDER — DOCUSATE SODIUM 100 MG/1
200 CAPSULE, LIQUID FILLED ORAL 2 TIMES DAILY PRN
Status: DISCONTINUED | OUTPATIENT
Start: 2023-12-09 | End: 2023-12-11 | Stop reason: HOSPADM

## 2023-12-09 RX ORDER — HYDROCORTISONE 25 MG/G
CREAM TOPICAL 3 TIMES DAILY PRN
Status: DISCONTINUED | OUTPATIENT
Start: 2023-12-09 | End: 2023-12-11 | Stop reason: HOSPADM

## 2023-12-09 RX ORDER — PROCHLORPERAZINE EDISYLATE 5 MG/ML
5 INJECTION INTRAMUSCULAR; INTRAVENOUS EVERY 6 HOURS PRN
Status: DISCONTINUED | OUTPATIENT
Start: 2023-12-09 | End: 2023-12-11 | Stop reason: HOSPADM

## 2023-12-09 RX ORDER — OXYTOCIN/RINGER'S LACTATE 30/500 ML
334 PLASTIC BAG, INJECTION (ML) INTRAVENOUS ONCE AS NEEDED
Status: DISCONTINUED | OUTPATIENT
Start: 2023-12-09 | End: 2023-12-11 | Stop reason: HOSPADM

## 2023-12-09 RX ORDER — MISOPROSTOL 200 UG/1
800 TABLET ORAL ONCE AS NEEDED
Status: DISCONTINUED | OUTPATIENT
Start: 2023-12-09 | End: 2023-12-11 | Stop reason: HOSPADM

## 2023-12-09 RX ORDER — CARBOPROST TROMETHAMINE 250 UG/ML
250 INJECTION, SOLUTION INTRAMUSCULAR
Status: DISCONTINUED | OUTPATIENT
Start: 2023-12-09 | End: 2023-12-11 | Stop reason: HOSPADM

## 2023-12-09 RX ORDER — SODIUM CHLORIDE 0.9 % (FLUSH) 0.9 %
10 SYRINGE (ML) INJECTION
Status: DISCONTINUED | OUTPATIENT
Start: 2023-12-09 | End: 2023-12-11 | Stop reason: HOSPADM

## 2023-12-09 RX ORDER — METHYLERGONOVINE MALEATE 0.2 MG/ML
200 INJECTION INTRAVENOUS ONCE AS NEEDED
Status: DISCONTINUED | OUTPATIENT
Start: 2023-12-09 | End: 2023-12-11 | Stop reason: HOSPADM

## 2023-12-09 RX ORDER — IBUPROFEN 400 MG/1
800 TABLET ORAL EVERY 6 HOURS PRN
Status: DISCONTINUED | OUTPATIENT
Start: 2023-12-09 | End: 2023-12-11 | Stop reason: HOSPADM

## 2023-12-09 RX ORDER — OXYTOCIN/RINGER'S LACTATE 30/500 ML
95 PLASTIC BAG, INJECTION (ML) INTRAVENOUS ONCE
Status: DISCONTINUED | OUTPATIENT
Start: 2023-12-09 | End: 2023-12-11 | Stop reason: HOSPADM

## 2023-12-09 RX ORDER — HYDROCODONE BITARTRATE AND ACETAMINOPHEN 5; 325 MG/1; MG/1
1 TABLET ORAL EVERY 4 HOURS PRN
Status: DISCONTINUED | OUTPATIENT
Start: 2023-12-09 | End: 2023-12-11 | Stop reason: HOSPADM

## 2023-12-09 RX ADMIN — ONDANSETRON 4 MG: 2 INJECTION INTRAMUSCULAR; INTRAVENOUS at 08:12

## 2023-12-09 RX ADMIN — Medication 17 MILLI-UNITS/MIN: at 03:12

## 2023-12-09 RX ADMIN — DEXTROSE 3 MILLION UNITS: 50 INJECTION, SOLUTION INTRAVENOUS at 01:12

## 2023-12-09 RX ADMIN — SODIUM CHLORIDE, SODIUM LACTATE, POTASSIUM CHLORIDE, AND CALCIUM CHLORIDE: .6; .31; .03; .02 INJECTION, SOLUTION INTRAVENOUS at 08:12

## 2023-12-09 RX ADMIN — BENZOCAINE AND LEVOMENTHOL: 200; 5 SPRAY TOPICAL at 03:12

## 2023-12-09 RX ADMIN — IBUPROFEN 800 MG: 400 TABLET, FILM COATED ORAL at 03:12

## 2023-12-09 RX ADMIN — Medication 334 MILLI-UNITS/MIN: at 09:12

## 2023-12-09 RX ADMIN — ONDANSETRON 4 MG: 2 INJECTION INTRAMUSCULAR; INTRAVENOUS at 01:12

## 2023-12-09 RX ADMIN — DEXTROSE 3 MILLION UNITS: 50 INJECTION, SOLUTION INTRAVENOUS at 05:12

## 2023-12-09 RX ADMIN — Medication 19 MILLI-UNITS/MIN: at 05:12

## 2023-12-09 NOTE — NURSING TRANSFER
Nursing Transfer Note      12/9/2023   12:56 PM    Nurse giving handoff:GRISEL Anderson  Nurse receiving handoff:GRISEL Grullon    Reason patient is being transferred: postpartum    Transfer To: 2103    Transfer via wheelchair    Transfer with personal belongings    Transported by Monica RECINOS    Transfer Vital Signs:  Blood Pressure:136/66  Heart Rate:88  O2:100  Temperature:98.0  Respirations:18    Telemetry:   Order for Tele Monitor? No    Additional Lines:     4eyes on Skin: yes    Medicines sent: none    Any special needs or follow-up needed: none    Patient belongings transferred with patient: Yes    Chart send with patient: Yes    Notified: family     Patient reassessed at: 1220 12/9/23(date, time)  1  Upon arrival to floor: patient oriented to room, call bell in reach, and bed in lowest position

## 2023-12-09 NOTE — L&D DELIVERY NOTE
[facility]  Vaginal Delivery   Operative Note    SUMMARY     Normal spontaneous vaginal delivery of live infant, was placed on mothers abdomen for skin to skin and bulb suctioning performed.  Infant delivered position OA over intact perineum.  Nuchal cord: Yes, cord reduced following delivery.    Spontaneous delivery of placenta and IV pitocin given noting good uterine tone.  Patient had first-degree laceration right at hymenal ring as well as in her right minora, both made hemostatic with individual sutures of 3-0 chromic. .  Patient tolerated delivery well. Sponge needle and lap counted correctly x2.  EBL=250cc    Indications: 39 weeks gestation of pregnancy  Pregnancy complicated by:   Patient Active Problem List   Diagnosis    39 weeks gestation of pregnancy    GBS (group B Streptococcus carrier), +RV culture, currently pregnant    Anemia    Morbid obesity     Admitting GA: 40w0d    Delivery Information for Girl Meghana Munoz    Birth information:  YOB: [birth year]23   Time of birth: 9:30 AM   Sex: female   Head Delivery Date/Time:     Delivery type:    Gestational Age: 40w0d        Delivery Providers    Delivering clinician:            Measurements    Weight:   Length:          Apgars    Living status:   Apgar Component Scores:  1 min.:  5 min.:  10 min.:  15 min.:  20 min.:    Skin color:         Heart rate:         Reflex irritability:         Muscle tone:         Respiratory effort:         Total:                                  Interventions/Resuscitation           Cord    No data filed       Placenta    Placenta delivery date/time:   Placenta removal:            Labor Events:       labor:       Labor Onset Date/Time:         Dilation Complete Date/Time:         Start Pushing Date/Time:         Start Pushing Date/Time:       Rupture Date/Time: 23  0805         Rupture type: ARM (Artificial Rupture)         Fluid Amount:       Fluid Color: Clear                steroids:       Antibiotics given for GBS:       Induction:       Indications for induction:        Augmentation:       Indications for augmentation:       Labor complications:       Additional complications:          Cervical ripening:                     Delivery:      Episiotomy:       Indication for Episiotomy:       Perineal Lacerations:   Repaired:      Periurethral Laceration:   Repaired:     Labial Laceration:   Repaired:     Sulcus Laceration:   Repaired:     Vaginal Laceration:   Repaired:     Cervical Laceration:   Repaired:     Repair suture:       Repair # of packets:       Last Value - EBL - Nursing (mL):       Sum - EBL - Nursing (mL): 0     Last Value - EBL - Anesthesia (mL):      Calculated QBL (mL):       Vaginal Sweep Performed:       Surgicount Correct:         Other providers:            Details (if applicable):  Trial of Labor      Categorization:      Priority:     Indications for :     Incision Type:       Additional  information:  Forceps:    Vacuum:    Breech:    Observed anomalies    Other (Comments):

## 2023-12-09 NOTE — PLAN OF CARE
Problem: Adult Inpatient Plan of Care  Goal: Plan of Care Review  Outcome: Ongoing, Progressing  Goal: Patient-Specific Goal (Individualized)  Outcome: Ongoing, Progressing  Goal: Absence of Hospital-Acquired Illness or Injury  Outcome: Ongoing, Progressing  Goal: Optimal Comfort and Wellbeing  Outcome: Ongoing, Progressing  Goal: Readiness for Transition of Care  Outcome: Ongoing, Progressing     Problem: Bariatric Environmental Safety  Goal: Safety Maintained with Care  Outcome: Ongoing, Progressing     Problem: Delayed Labor Progression (Labor)  Goal: Effective Progression to Delivery  Outcome: Ongoing, Progressing     Problem: Change in Fetal Wellbeing (Labor)  Goal: Stable Fetal Wellbeing  Outcome: Ongoing, Progressing     Problem: Bleeding (Labor)  Goal: Hemostasis  Outcome: Ongoing, Progressing     Problem: Infection (Labor)  Goal: Absence of Infection Signs and Symptoms  Outcome: Ongoing, Progressing     Problem: Labor Pain (Labor)  Goal: Acceptable Pain Control  Outcome: Ongoing, Progressing     Problem: Uterine Tachysystole (Labor)  Goal: Normal Uterine Contraction Pattern  Outcome: Ongoing, Progressing     Problem: Infection  Goal: Absence of Infection Signs and Symptoms  Outcome: Ongoing, Progressing

## 2023-12-10 LAB
BASOPHILS # BLD AUTO: 0.02 K/UL (ref 0–0.2)
BASOPHILS NFR BLD: 0.2 % (ref 0–1.9)
DIFFERENTIAL METHOD: ABNORMAL
EOSINOPHIL # BLD AUTO: 0.3 K/UL (ref 0–0.5)
EOSINOPHIL NFR BLD: 2.6 % (ref 0–8)
ERYTHROCYTE [DISTWIDTH] IN BLOOD BY AUTOMATED COUNT: 14.5 % (ref 11.5–14.5)
HCT VFR BLD AUTO: 30.6 % (ref 37–48.5)
HGB BLD-MCNC: 9.8 G/DL (ref 12–16)
IMM GRANULOCYTES # BLD AUTO: 0.03 K/UL (ref 0–0.04)
IMM GRANULOCYTES NFR BLD AUTO: 0.3 % (ref 0–0.5)
LYMPHOCYTES # BLD AUTO: 2 K/UL (ref 1–4.8)
LYMPHOCYTES NFR BLD: 20.9 % (ref 18–48)
MCH RBC QN AUTO: 27.1 PG (ref 27–31)
MCHC RBC AUTO-ENTMCNC: 32 G/DL (ref 32–36)
MCV RBC AUTO: 85 FL (ref 82–98)
MONOCYTES # BLD AUTO: 0.7 K/UL (ref 0.3–1)
MONOCYTES NFR BLD: 6.8 % (ref 4–15)
NEUTROPHILS # BLD AUTO: 6.7 K/UL (ref 1.8–7.7)
NEUTROPHILS NFR BLD: 69.2 % (ref 38–73)
NRBC BLD-RTO: 0 /100 WBC
PLATELET # BLD AUTO: 198 K/UL (ref 150–450)
PMV BLD AUTO: 10.2 FL (ref 9.2–12.9)
RBC # BLD AUTO: 3.61 M/UL (ref 4–5.4)
WBC # BLD AUTO: 9.71 K/UL (ref 3.9–12.7)

## 2023-12-10 PROCEDURE — 99231 SBSQ HOSP IP/OBS SF/LOW 25: CPT | Mod: ,,, | Performed by: OBSTETRICS & GYNECOLOGY

## 2023-12-10 PROCEDURE — 85025 COMPLETE CBC W/AUTO DIFF WBC: CPT | Performed by: OBSTETRICS & GYNECOLOGY

## 2023-12-10 PROCEDURE — 36415 COLL VENOUS BLD VENIPUNCTURE: CPT | Performed by: OBSTETRICS & GYNECOLOGY

## 2023-12-10 PROCEDURE — 12000002 HC ACUTE/MED SURGE SEMI-PRIVATE ROOM

## 2023-12-10 PROCEDURE — 25000003 PHARM REV CODE 250: Performed by: OBSTETRICS & GYNECOLOGY

## 2023-12-10 PROCEDURE — 99231 PR SUBSEQUENT HOSPITAL CARE,LEVL I: ICD-10-PCS | Mod: ,,, | Performed by: OBSTETRICS & GYNECOLOGY

## 2023-12-10 RX ORDER — FERROUS SULFATE 325(65) MG
325 TABLET ORAL
Qty: 48 TABLET | Refills: 0 | Status: SHIPPED | OUTPATIENT
Start: 2023-12-10 | End: 2024-03-09

## 2023-12-10 RX ORDER — IBUPROFEN 600 MG/1
600 TABLET ORAL EVERY 6 HOURS PRN
Qty: 40 TABLET | Refills: 0 | Status: SHIPPED | OUTPATIENT
Start: 2023-12-10

## 2023-12-10 RX ADMIN — IBUPROFEN 800 MG: 400 TABLET, FILM COATED ORAL at 08:12

## 2023-12-10 RX ADMIN — DOCUSATE SODIUM 200 MG: 100 CAPSULE, LIQUID FILLED ORAL at 08:12

## 2023-12-10 RX ADMIN — IBUPROFEN 800 MG: 400 TABLET, FILM COATED ORAL at 01:12

## 2023-12-10 NOTE — PLAN OF CARE
Assessment completed: at bedside with mother.    Address mother and baby will discharge home to:[address]      [place] [address]     History of Substance Abuse issues:  Mother denies                Assistive Treatment Programs or Medications?  Mother denies    History of Mental Health issues:  Mother denies    History of Domestic Violence:  Mother denies       12/10/23 0842   Discharge Planning   Assessment Type Discharge Planning Assessment   Resource/Environmental Concerns none   Support Systems Parent   Equipment Currently Used at Home none   Current Living Arrangements home   Patient/Family Anticipates Transition to home with family   Patient/Family Anticipated Services at Transition none   DME Needed Upon Discharge  none   Discharge Plan A Home with family   Discharge Plan B Home with family

## 2023-12-10 NOTE — PLAN OF CARE
OB Screen completed and no needs identified at this time.  mother was encouraged to contact office if further needs arise.    12/10/23 6658   OB SCREEN   Assessment Type Discharge Planning Assessment   Source of Information patient;health record   Received Prenatal Care Yes   Any indications/suspicions for None   Is this a teen pregnancy No   Is the baby in NICU No   Indication for adoption/Safe Haven No   Indication for DME/post-acute needs No   HIV (+) No   Any congenital  disorders No   Fetal demise/ death No

## 2023-12-10 NOTE — PROGRESS NOTES
Pediatrics has now see the infant and due to some infant issues feels continued hospitalization is warranted.    In light of pediatric recommendation will hold discharge at this time.    Expectant management.

## 2023-12-10 NOTE — DISCHARGE SUMMARY
[facility]  Department of Obstetrics & Gynecology  Discharge Note  Discharge Summary:  12/10/2023    Hospital: [facility]    Admit Date: 12/9/2023    Discharge Date: 12/10/2023    Delivery Physician: Dr Dasha Link    Procedures Performed:  Vaginal Delivery    Hospital Course:      The patient is status post vaginal delivery on the morning of 12/09/2023.    The patient currently reports no postpartum issues.  Good pain control on oral medications.    Ambulating without difficulty.   Tolerating regular diet.    Bottle Feeding.  No reported breast issues.      The patient desires discharge to home today if infant cleared by Pediatrics    Vital signs reviewed.  BP:  124/72    Labs:    Recent Results (from the past 336 hour(s))   CBC auto differential    Collection Time: 12/10/23  3:22 AM   Result Value Ref Range    WBC 9.71 3.90 - 12.70 K/uL    Hemoglobin 9.8 (L) 12.0 - 16.0 g/dL    Hematocrit 30.6 (L) 37.0 - 48.5 %    Platelets 198 150 - 450 K/uL   CBC with Auto Differential    Collection Time: 12/07/23  3:08 PM   Result Value Ref Range    WBC 7.07 3.90 - 12.70 K/uL    Hemoglobin 10.5 (L) 12.0 - 16.0 g/dL    Hematocrit 32.8 (L) 37.0 - 48.5 %    Platelets 237 150 - 450 K/uL        No pulmonary or cardiac issues noted.  Heart: Regular rate and rhythm  Lungs:  No abnormal pulmonary effort.  Clear to auscultation.    Abdomen is soft with firm fundus and appropriate tenderness.     Discharge Diet: Regular     Discharge Activity: Pelvic rest x 6 weeks, light activity x 2 weeks.     Discharge Medications:      Medication List        START taking these medications      ferrous sulfate 325 mg (65 mg iron) Tab tablet  Commonly known as: FEOSOL  Take 1 tablet (325 mg total) by mouth 4 (four) times a week.     ibuprofen 600 MG tablet  Commonly known as: ADVIL,MOTRIN  Take 1 tablet (600 mg total) by mouth every 6 (six) hours as needed for Pain.            CONTINUE taking these medications       PRENATAL 1+1 ORAL               Where to Get Your Medications        These medications were sent to [id number] - [place] - [address] AT [facility] [address]  [address] [address]      Phone: [phone]   ferrous sulfate 325 mg (65 mg iron) Tab tablet  ibuprofen 600 MG tablet          Discharge Follow-Up: 6 Weeks or as needed.    Condition on Discharge:  stable    Discharge Diagnosis:   S/P:  Vaginal Delivery  Postpartum anemia    Precautions and instructions discussed prior to discarge, call with any postoperative/postpartum issues.    The above was reviewed and discussed with the patient.    The patient's questions regarding discharge were answered and she is in agreement with the discharge plan.    Marquis Knight MD  Department OBN  [facility]

## 2023-12-11 VITALS
WEIGHT: 293 LBS | HEIGHT: 66 IN | HEART RATE: 82 BPM | SYSTOLIC BLOOD PRESSURE: 153 MMHG | OXYGEN SATURATION: 97 % | BODY MASS INDEX: 47.09 KG/M2 | TEMPERATURE: 98 F | DIASTOLIC BLOOD PRESSURE: 73 MMHG | RESPIRATION RATE: 18 BRPM

## 2023-12-11 PROCEDURE — 90471 IMMUNIZATION ADMIN: CPT | Performed by: OBSTETRICS & GYNECOLOGY

## 2023-12-11 PROCEDURE — 63600175 PHARM REV CODE 636 W HCPCS: Performed by: OBSTETRICS & GYNECOLOGY

## 2023-12-11 PROCEDURE — 90715 TDAP VACCINE 7 YRS/> IM: CPT | Performed by: OBSTETRICS & GYNECOLOGY

## 2023-12-11 PROCEDURE — 25000003 PHARM REV CODE 250: Performed by: OBSTETRICS & GYNECOLOGY

## 2023-12-11 RX ORDER — IBUPROFEN 800 MG/1
800 TABLET ORAL EVERY 6 HOURS PRN
Qty: 40 TABLET | Refills: 1 | Status: SHIPPED | OUTPATIENT
Start: 2023-12-11

## 2023-12-11 RX ADMIN — CLOSTRIDIUM TETANI TOXOID ANTIGEN (FORMALDEHYDE INACTIVATED), CORYNEBACTERIUM DIPHTHERIAE TOXOID ANTIGEN (FORMALDEHYDE INACTIVATED), BORDETELLA PERTUSSIS TOXOID ANTIGEN (GLUTARALDEHYDE INACTIVATED), BORDETELLA PERTUSSIS FILAMENTOUS HEMAGGLUTININ ANTIGEN (FORMALDEHYDE INACTIVATED), BORDETELLA PERTUSSIS PERTACTIN ANTIGEN, AND BORDETELLA PERTUSSIS FIMBRIAE 2/3 ANTIGEN 0.5 ML: 5; 2; 2.5; 5; 3; 5 INJECTION, SUSPENSION INTRAMUSCULAR at 11:12

## 2023-12-11 RX ADMIN — IBUPROFEN 800 MG: 400 TABLET, FILM COATED ORAL at 05:12

## 2023-12-11 NOTE — DISCHARGE INSTRUCTIONS
FOLLOW UP WITH YOUR DOCTOR IN 6 WEEKS OR SOONER FOR ANY PROBLEMS.    Pelvic rest for 6 weeks (no sex, tampons, douching, nothing in the vagina)   You can experience vaginal bleeding on and off for up to 6 weeks, it will gradually get lighter and the color will change from bright red to a brownish discharge towards the end.     Activity:   NO strenuous activities or exercising. Do not /lift anything over 15 pounds. Do not do heavy housework or cleaning.   NO driving for 3 days. You may take short car trips but do not drive.   You may shower and/or soak in a bathtub, both are acceptable. Use a mild soap, no heavy perfumes or fragrances to avoid irritation.   If constipation develops: You may take Colace (stool softener), Milk of Magnesia, Dulcolax or Miralax. All of these medications are sold over the counter.     Care of Episiotomy:   Local agents such as Tucks pads & Dermoplast spray. You may also use a Sitz bath: sitting in a tub of warm water for 15 minutes 2-3 times per day will help relieve the discomfort.     Pain Relief:   You may take Motrin for mild pain & uterine cramping.     Emotional Changes:   You may experience baby blues after delivery. You may feel let down, anxious and cry easily. This is normal. These feelings can begin 2-3 days after delivery and usually disappear in about a week or two. Prolonged sadness may indicate postpartum depression.       ***SEEK IMMEDIATE HELP FROM THE EMERGENCY ROOM IF YOU HAVE ANY CHEST PAIN OR DIFFICULTY BREATHING.    Call your doctor for any of the following:   Problems with any of your medications, inability to eat.   Foul smelling vaginal discharge.   Temperature above 100.4.   Heavy vaginal bleeding. All women bleed different after delivery and each delivery is different. Heavy bleeding consists of saturating a chanel pad in a 1 hour time period. Passing clots are normal, if you pass a blood clot larger than the size of a golf ball call your doctor's office.    If you experience pain in your legs/calves, if one leg increases in size and becomes swollen or becomes hot to touch or discolored.   Crying or periods of sadness beyond 2 weeks.     If you are breast feeding:   Wash your breasts with mild soap and warm water.   You should wear a supportive bra.   You should continue to take a prenatal vitamin for 6 weeks or until breastfeeding is discontinued.   If nipples are sore, apply a few drops of breast milk after nursing and let air dry or you can use Lanolin cream.   If breasts are engorged, apply warmth and express milk.   [facility] lactation consultant is available at [phone] for your breastfeeding needs.    If you are not breastfeeding:   Wear a tight bra and do not stimulate your breasts. Avoid handling your breasts and do not express milk. You may apply ice packs or cabbage leaves to relieve discomfort from engorgement. If your breasts become warm to touch, reddened or lumps develop call your doctor.

## 2023-12-11 NOTE — DISCHARGE SUMMARY
"[facility]  Obstetrics  Discharge Summary      Patient Name: Meghana Munoz  MRN: [id number]  Admission Date: 12/7/2023  Hospital Length of Stay: 4 days  Discharge Date and Time:  12/11/2023 12:22 PM  Attending Physician: Dasha Link MD   Discharging Provider: Dasha Link MD   Primary Care Provider: Radha, Primary Doctor    HPI: G1 at 39.6 weeks for IOL, aware of not wilfred favorable cx and requests to proceed.  +GBS.  Anemia.          * No surgery found *     Hospital Course:   No notes on file  PPD#2 ready for d/c  VSSAF, occ borderline BPs  PE stable  Hg=9gm, rh pos  D/c home, motrin 800mg         Final Active Diagnoses:    Diagnosis Date Noted POA    PRINCIPAL PROBLEM:  39 weeks gestation of pregnancy [Z3A.39] 12/08/2023 Not Applicable    GBS (group B Streptococcus carrier), +RV culture, currently pregnant [O99.820] 12/08/2023 Not Applicable    Anemia [D64.9] 12/08/2023 Yes    Morbid obesity [E66.01] 12/08/2023 Yes      Problems Resolved During this Admission:        Significant Diagnostic Studies: Labs: All labs within the past 24 hours have been reviewed  Lab Results   Component Value Date    GROUPTRH O POS 12/07/2023         Feeding Method: breast    Immunizations       Date Immunization Status Dose Route/Site Given by    12/11/23 1121 MMR Deferred 0.5 mL Subcutaneous/ Nava Wong RN    12/11/23 1117 Tdap Deleted 0.5 mL Intramuscular/     12/11/23 1116 Tdap Given 0.5 mL Intramuscular/Left arm Nava Wong RN            Delivery:    Episiotomy: None   Lacerations: 1st   Repair suture:     Repair # of packets: 2   Blood loss (ml):       Birth information:  YOB: 2021   Time of birth: 9:30 AM   Sex: female   Delivery type: Vaginal, Spontaneous   Gestational Age: 40w0d     Measurements    Weight: 2980 g  Weight (lbs): 6 lb 9.1 oz  Length: 50.2 cm  Length (in): 19.75"         Delivery Clinician: Delivery Providers    Delivering clinician: Dasha Link MD   Provider Role    " Monica De Jesus, RN Registered Nurse    Harmony Dejesus RN Registered Nurse    Meghan Antony RN Registered Nurse    Reyes, Darnell, CST Surgical Tech             Additional  information:  Forceps:    Vacuum:    Breech:    Observed anomalies      Living?:     Apgars    Living status: Living  Apgar Component Scores:  1 min.:  5 min.:  10 min.:  15 min.:  20 min.:    Skin color:  1  1       Heart rate:  2  2       Reflex irritability:  2  2       Muscle tone:  2  2       Respiratory effort:  2  2       Total:  9  9       Apgars assigned by: HARMONY DEJESUS         Placenta: Delivered:       appearance  Pending Diagnostic Studies:       None            Discharged Condition: good    Disposition: Home or Self Care    Follow Up:   Follow-up Information       Dasha Link MD. Call.    Specialties: Obstetrics, Obstetrics and Gynecology  Contact information:  [address] [facility]  [address]  [facility]  [place] [address]  [phone]                           Patient Instructions:      Diet general     Sponge bath only until clinic visit     Keep surgical extremity elevated     Ice to affected area     Lifting restrictions     Other restrictions (specify):   Order Comments: Discharge Instructions:  Follow-up in 2 weeks or as needed.  Call immediately for any abnormal pain bleeding fever chills nausea vomiting or other significant postoperative issues.    Pelvic rest until follow-up.  No tub baths until follow-up.  Diet as tolerated     Call MD for:  temperature >100.4     Call MD for:  persistent nausea and vomiting     Call MD for:  severe uncontrolled pain     Call MD for:  difficulty breathing, headache or visual disturbances     Call MD for:  redness, tenderness, or signs of infection (pain, swelling, redness, odor or green/yellow discharge around incision site)     Call MD for:  hives     Call MD for:  persistent dizziness or light-headedness     Call MD for:  extreme fatigue     Call MD for:    Order Comments: Discharge Instructions:  Follow-up in 2 weeks or as needed.  Call immediately for any abnormal pain bleeding fever chills nausea vomiting or other significant postoperative issues.    Pelvic rest until follow-up.  No tub baths until follow-up.  Diet as tolerated     No dressing needed     Activity as tolerated     Shower on day dressing removed (No bath)   Order Comments: No tub baths until patient seen for postoperative evaluation in the office     Medications:  Current Discharge Medication List        START taking these medications    Details   ferrous sulfate (FEOSOL) 325 mg (65 mg iron) Tab tablet Take 1 tablet (325 mg total) by mouth 4 (four) times a week.  Qty: 48 tablet, Refills: 0      !! ibuprofen (ADVIL,MOTRIN) 600 MG tablet Take 1 tablet (600 mg total) by mouth every 6 (six) hours as needed for Pain.  Qty: 40 tablet, Refills: 0      !! ibuprofen (ADVIL,MOTRIN) 800 MG tablet Take 1 tablet (800 mg total) by mouth every 6 (six) hours as needed.  Qty: 40 tablet, Refills: 1       !! - Potential duplicate medications found. Please discuss with provider.        CONTINUE these medications which have NOT CHANGED    Details   prenatal vit/iron fum/folic ac (PRENATAL 1+1 ORAL) Take 1 tablet by mouth once daily.             Dasha Link MD  Obstetrics  [facility]

## 2023-12-11 NOTE — ANESTHESIA POSTPROCEDURE EVALUATION
Anesthesia Post Evaluation    Patient: Meghana Tammy    Procedure(s) Performed: * No procedures listed *    Final Anesthesia Type: epidural      Patient location during evaluation: floor  Patient participation: Yes- Able to Participate  Level of consciousness: awake and alert  Post-procedure vital signs: reviewed and stable  Pain management: adequate  Airway patency: patent    PONV status at discharge: No PONV  Anesthetic complications: no      Cardiovascular status: blood pressure returned to baseline  Respiratory status: unassisted  Hydration status: euvolemic  Follow-up not needed.              Vitals Value Taken Time   /71 12/11/23 0102   Temp 36.8 °C (98.3 °F) 12/11/23 0102   Pulse 93 12/11/23 0102   Resp 18 12/11/23 0102   SpO2 97 % 12/11/23 0102         No case tracking events are documented in the log.      Pain/Isela Score: Pain Rating Prior to Med Admin: 6 (12/10/2023  8:22 AM)  Pain Rating Post Med Admin: 3 (12/10/2023  2:25 AM)

## 2024-01-30 ENCOUNTER — PATIENT OUTREACH (OUTPATIENT)
Dept: EMERGENCY MEDICINE | Facility: HOSPITAL | Age: 24
End: 2024-01-30

## 2024-05-05 ENCOUNTER — OFFICE VISIT (OUTPATIENT)
Dept: URGENT CARE | Facility: CLINIC | Age: 24
End: 2024-05-05
Payer: MEDICAID

## 2024-05-05 VITALS
BODY MASS INDEX: 47.09 KG/M2 | HEART RATE: 89 BPM | WEIGHT: 293 LBS | SYSTOLIC BLOOD PRESSURE: 113 MMHG | OXYGEN SATURATION: 96 % | TEMPERATURE: 98 F | RESPIRATION RATE: 18 BRPM | HEIGHT: 66 IN | DIASTOLIC BLOOD PRESSURE: 80 MMHG

## 2024-05-05 DIAGNOSIS — J02.9 SORE THROAT: Primary | ICD-10-CM

## 2024-05-05 DIAGNOSIS — N89.8 VAGINAL DISCHARGE: ICD-10-CM

## 2024-05-05 LAB
CTP QC/QA: YES
S PYO RRNA THROAT QL PROBE: NEGATIVE

## 2024-05-05 PROCEDURE — 99214 OFFICE O/P EST MOD 30 MIN: CPT | Mod: S$GLB,,, | Performed by: NURSE PRACTITIONER

## 2024-05-05 PROCEDURE — 87880 STREP A ASSAY W/OPTIC: CPT | Mod: QW,,, | Performed by: NURSE PRACTITIONER

## 2024-05-05 RX ORDER — AMOXICILLIN 500 MG/1
500 TABLET, FILM COATED ORAL EVERY 12 HOURS
Qty: 20 TABLET | Refills: 0 | Status: SHIPPED | OUTPATIENT
Start: 2024-05-05 | End: 2024-05-15

## 2024-05-05 RX ORDER — DEXAMETHASONE SODIUM PHOSPHATE 4 MG/ML
8 INJECTION, SOLUTION INTRA-ARTICULAR; INTRALESIONAL; INTRAMUSCULAR; INTRAVENOUS; SOFT TISSUE
Status: COMPLETED | OUTPATIENT
Start: 2024-05-05 | End: 2024-05-05

## 2024-05-05 RX ADMIN — DEXAMETHASONE SODIUM PHOSPHATE 8 MG: 4 INJECTION, SOLUTION INTRA-ARTICULAR; INTRALESIONAL; INTRAMUSCULAR; INTRAVENOUS; SOFT TISSUE at 03:05

## 2024-05-05 NOTE — PROGRESS NOTES
Subjective:      Patient ID: Meghana Munoz is a 23 y.o. female.    Vitals:  vitals were not taken for this visit.     Chief Complaint: Sore Throat    HPI  ROS   Objective:     Physical Exam    Assessment:     No diagnosis found.    Plan:       There are no diagnoses linked to this encounter.

## 2024-05-05 NOTE — PROGRESS NOTES
"Subjective:      Patient ID: Meghana Munoz is a 23 y.o. female.    Vitals:  height is 5' 6" (1.676 m) and weight is 154.2 kg (340 lb) (abnormal). Her temperature is 98.3 °F (36.8 °C). Her blood pressure is 113/80 and her pulse is 89. Her respiration is 18 and oxygen saturation is 96%.     Chief Complaint: Sore Throat and Vaginal Discharge    BV while preganant, discharge present X 3 days,    Sore Throat   This is a new problem. The current episode started in the past 7 days. There has been no fever. The pain is moderate. Associated symptoms include congestion, coughing, headaches and trouble swallowing. She has tried acetaminophen and NSAIDs for the symptoms. The treatment provided no relief.   Vaginal Discharge  The patient's primary symptoms include vaginal discharge. The problem occurs constantly. The patient is experiencing no pain. Associated symptoms include headaches and a sore throat. The vaginal discharge was milky. There has been no bleeding.       HENT:  Positive for congestion, sore throat and trouble swallowing.    Respiratory:  Positive for cough.    Genitourinary:  Positive for vaginal discharge.   Skin:  Negative for erythema.   Neurological:  Positive for headaches.      Objective:     Physical Exam   HENT:   Head: Normocephalic.   Mouth/Throat: Oropharyngeal exudate and posterior oropharyngeal erythema present.      Comments: No PTA, trismus or tongue elevation.   Eyes: Conjunctivae are normal.   Abdominal: Normal appearance.   Genitourinary:         Comments: Deferred      Neurological: She is alert.   Skin: No erythema   Psychiatric: Her behavior is normal. Mood, judgment and thought content normal.   Nursing note and vitals reviewed.      Assessment:     1. Sore throat    2. Vaginal discharge        Plan:       Sore throat  -     POCT rapid strep A    Vaginal discharge  -     NuSwab Vaginitis Plus (VG+)    Other orders  -     dexAMETHasone injection 8 mg  -     amoxicillin (AMOXIL) 500 MG Tab; " Take 1 tablet (500 mg total) by mouth every 12 (twelve) hours. for 10 days  Dispense: 20 tablet; Refill: 0    Pt presents with complaint of exudative pharyngitis and Hx of strep. Screen (-) but meets centor criteria. She additionally has mucous discharge. Denies lesions, denies concerns for STDs. Discussed sending culture prior to Tx. She deferred on provider swab and obtained swab herself. She will be discharged with amoxicillin after IM steroid provided. OB/GYN FU also advised and will call to treat NuSwab results.

## 2024-05-09 LAB
A VAGINAE DNA VAG QL NAA+PROBE: NORMAL SCORE
BVAB2 DNA VAG QL NAA+PROBE: NORMAL SCORE
C ALBICANS DNA VAG QL NAA+PROBE: NEGATIVE
C GLABRATA DNA VAG QL NAA+PROBE: NEGATIVE
C TRACH DNA VAG QL NAA+PROBE: NEGATIVE
MEGA1 DNA VAG QL NAA+PROBE: NORMAL SCORE
N GONORRHOEA DNA VAG QL NAA+PROBE: NEGATIVE
T VAGINALIS DNA VAG QL NAA+PROBE: NEGATIVE

## 2024-07-19 ENCOUNTER — PATIENT OUTREACH (OUTPATIENT)
Dept: EMERGENCY MEDICINE | Facility: HOSPITAL | Age: 24
End: 2024-07-19

## 2024-11-18 PROBLEM — Z3A.39 39 WEEKS GESTATION OF PREGNANCY: Status: RESOLVED | Noted: 2023-12-08 | Resolved: 2024-11-18

## 2024-12-17 ENCOUNTER — HOSPITAL ENCOUNTER (EMERGENCY)
Facility: HOSPITAL | Age: 24
Discharge: HOME | End: 2024-12-17
Attending: EMERGENCY MEDICINE
Payer: COMMERCIAL

## 2024-12-17 VITALS
OXYGEN SATURATION: 99 % | TEMPERATURE: 98 F | RESPIRATION RATE: 16 BRPM | DIASTOLIC BLOOD PRESSURE: 87 MMHG | HEIGHT: 66 IN | WEIGHT: 293 LBS | HEART RATE: 86 BPM | BODY MASS INDEX: 47.09 KG/M2 | SYSTOLIC BLOOD PRESSURE: 161 MMHG

## 2024-12-17 DIAGNOSIS — R10.33 PERIUMBILICAL ABDOMINAL PAIN: ICD-10-CM

## 2024-12-17 DIAGNOSIS — M25.511 ACUTE PAIN OF RIGHT SHOULDER: Primary | ICD-10-CM
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
## 2024-12-17 LAB
B-HCG UR QL: NEGATIVE
CREAT SERPL-MCNC: 0.7 MG/DL (ref 0.5–1.4)
CTP QC/QA: YES
HCV AB SERPL QL IA: NEGATIVE
HIV 1+2 AB+HIV1 P24 AG SERPL QL IA: NEGATIVE
SAMPLE: NORMAL

## 2024-12-17 PROCEDURE — 82565 ASSAY OF CREATININE: CPT

## 2024-12-17 PROCEDURE — 36415 COLL VENOUS BLD VENIPUNCTURE: CPT | Performed by: EMERGENCY MEDICINE

## 2024-12-17 PROCEDURE — 81025 URINE PREGNANCY TEST: CPT

## 2024-12-17 PROCEDURE — 86803 HEPATITIS C AB TEST: CPT | Performed by: EMERGENCY MEDICINE

## 2024-12-17 PROCEDURE — 99285 EMERGENCY DEPT VISIT HI MDM: CPT | Mod: 25

## 2024-12-17 PROCEDURE — 87389 HIV-1 AG W/HIV-1&-2 AB AG IA: CPT | Performed by: EMERGENCY MEDICINE

## 2024-12-17 PROCEDURE — 25500020 PHARM REV CODE 255

## 2024-12-17 PROCEDURE — 25000003 PHARM REV CODE 250

## 2024-12-17 RX ORDER — HYDROCODONE BITARTRATE AND ACETAMINOPHEN 5; 325 MG/1; MG/1
1 TABLET ORAL
Status: COMPLETED | OUTPATIENT
Start: 2024-12-17 | End: 2024-12-17

## 2024-12-17 RX ORDER — METHOCARBAMOL 500 MG/1
500 TABLET, FILM COATED ORAL 3 TIMES DAILY
Qty: 12 TABLET | Refills: 0 | Status: SHIPPED | OUTPATIENT
Start: 2024-12-17 | End: 2024-12-21

## 2024-12-17 RX ADMIN — IOHEXOL 100 ML: 350 INJECTION, SOLUTION INTRAVENOUS at 03:12

## 2024-12-17 RX ADMIN — HYDROCODONE BITARTRATE AND ACETAMINOPHEN 1 TABLET: 5; 325 TABLET ORAL at 04:12

## 2024-12-17 NOTE — DISCHARGE INSTRUCTIONS
Please take medication as prescribed.  Do not drink or drive while taking this medication.  If you start to experience lethargy, nausea, vomiting, confusion, double or blurry vision please return to the emergency department.
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
